# Patient Record
Sex: MALE | Race: WHITE | HISPANIC OR LATINO | Employment: UNEMPLOYED | URBAN - METROPOLITAN AREA
[De-identification: names, ages, dates, MRNs, and addresses within clinical notes are randomized per-mention and may not be internally consistent; named-entity substitution may affect disease eponyms.]

---

## 2023-02-02 ENCOUNTER — HOSPITAL ENCOUNTER (OUTPATIENT)
Dept: RADIOLOGY | Facility: HOSPITAL | Age: 83
Discharge: HOME | DRG: 522 | End: 2023-02-02
Attending: STUDENT IN AN ORGANIZED HEALTH CARE EDUCATION/TRAINING PROGRAM
Payer: MEDICAID

## 2023-02-02 ENCOUNTER — ANESTHESIA EVENT (OUTPATIENT)
Dept: SURGERY | Facility: HOSPITAL | Age: 83
DRG: 522 | End: 2023-02-02
Payer: MEDICAID

## 2023-02-02 ENCOUNTER — HOSPITAL ENCOUNTER (INPATIENT)
Facility: HOSPITAL | Age: 83
LOS: 4 days | Discharge: HOME | DRG: 522 | End: 2023-02-06
Attending: STUDENT IN AN ORGANIZED HEALTH CARE EDUCATION/TRAINING PROGRAM | Admitting: STUDENT IN AN ORGANIZED HEALTH CARE EDUCATION/TRAINING PROGRAM
Payer: MEDICAID

## 2023-02-02 DIAGNOSIS — S79.911A INJURY OF RIGHT HIP, INITIAL ENCOUNTER: Primary | ICD-10-CM

## 2023-02-02 DIAGNOSIS — S72.001A CLOSED DISPLACED FRACTURE OF RIGHT FEMORAL NECK: ICD-10-CM

## 2023-02-02 DIAGNOSIS — Z01.811 PRE-OP CHEST EXAM: ICD-10-CM

## 2023-02-02 DIAGNOSIS — S72.001A CLOSED FRACTURE OF NECK OF RIGHT FEMUR, INITIAL ENCOUNTER: Primary | ICD-10-CM

## 2023-02-02 DIAGNOSIS — S79.911A INJURY OF RIGHT HIP, INITIAL ENCOUNTER: ICD-10-CM

## 2023-02-02 PROBLEM — J96.11 CHRONIC HYPOXEMIC RESPIRATORY FAILURE: Status: ACTIVE | Noted: 2023-02-02

## 2023-02-02 PROBLEM — M80.00XA AGE-RELATED OSTEOPOROSIS WITH CURRENT PATHOLOGICAL FRACTURE: Status: ACTIVE | Noted: 2023-02-02

## 2023-02-02 LAB
25(OH)D3+25(OH)D2 SERPL-MCNC: 22 NG/ML (ref 30–96)
ABO + RH BLD: NORMAL
ALBUMIN SERPL BCP-MCNC: 3.3 G/DL (ref 3.5–5.2)
ALP SERPL-CCNC: 73 U/L (ref 55–135)
ALT SERPL W/O P-5'-P-CCNC: 18 U/L (ref 10–44)
ANION GAP SERPL CALC-SCNC: 5 MMOL/L (ref 8–16)
APTT BLDCRRT: 26.7 SEC (ref 21–32)
AST SERPL-CCNC: 18 U/L (ref 10–40)
BACTERIA #/AREA URNS AUTO: ABNORMAL /HPF
BASOPHILS # BLD AUTO: 0.03 K/UL (ref 0–0.2)
BASOPHILS NFR BLD: 0.3 % (ref 0–1.9)
BILIRUB SERPL-MCNC: 1.3 MG/DL (ref 0.1–1)
BILIRUB UR QL STRIP: NEGATIVE
BLD GP AB SCN CELLS X3 SERPL QL: NORMAL
BNP SERPL-MCNC: 55 PG/ML (ref 0–99)
BUN SERPL-MCNC: 24 MG/DL (ref 8–23)
CALCIUM SERPL-MCNC: 8.8 MG/DL (ref 8.7–10.5)
CHLORIDE SERPL-SCNC: 105 MMOL/L (ref 95–110)
CLARITY UR REFRACT.AUTO: ABNORMAL
CO2 SERPL-SCNC: 28 MMOL/L (ref 23–29)
COLOR UR AUTO: ABNORMAL
CREAT SERPL-MCNC: 0.9 MG/DL (ref 0.5–1.4)
DIFFERENTIAL METHOD: ABNORMAL
EOSINOPHIL # BLD AUTO: 0.3 K/UL (ref 0–0.5)
EOSINOPHIL NFR BLD: 2.7 % (ref 0–8)
ERYTHROCYTE [DISTWIDTH] IN BLOOD BY AUTOMATED COUNT: 13.9 % (ref 11.5–14.5)
EST. GFR  (NO RACE VARIABLE): >60 ML/MIN/1.73 M^2
ESTIMATED AVG GLUCOSE: 123 MG/DL (ref 68–131)
GLUCOSE SERPL-MCNC: 118 MG/DL (ref 70–110)
GLUCOSE UR QL STRIP: NEGATIVE
HBA1C MFR BLD: 5.9 % (ref 4–5.6)
HCT VFR BLD AUTO: 46.3 % (ref 40–54)
HGB BLD-MCNC: 14.7 G/DL (ref 14–18)
HGB UR QL STRIP: ABNORMAL
HYALINE CASTS UR QL AUTO: 0 /LPF
IMM GRANULOCYTES # BLD AUTO: 0.04 K/UL (ref 0–0.04)
IMM GRANULOCYTES NFR BLD AUTO: 0.3 % (ref 0–0.5)
INR PPP: 1 (ref 0.8–1.2)
KETONES UR QL STRIP: ABNORMAL
LEUKOCYTE ESTERASE UR QL STRIP: ABNORMAL
LYMPHOCYTES # BLD AUTO: 0.6 K/UL (ref 1–4.8)
LYMPHOCYTES NFR BLD: 5.2 % (ref 18–48)
MCH RBC QN AUTO: 28.8 PG (ref 27–31)
MCHC RBC AUTO-ENTMCNC: 31.7 G/DL (ref 32–36)
MCV RBC AUTO: 91 FL (ref 82–98)
MICROSCOPIC COMMENT: ABNORMAL
MONOCYTES # BLD AUTO: 1.2 K/UL (ref 0.3–1)
MONOCYTES NFR BLD: 10.7 % (ref 4–15)
NEUTROPHILS # BLD AUTO: 9.3 K/UL (ref 1.8–7.7)
NEUTROPHILS NFR BLD: 80.8 % (ref 38–73)
NITRITE UR QL STRIP: NEGATIVE
NRBC BLD-RTO: 0 /100 WBC
PH UR STRIP: 6 [PH] (ref 5–8)
PLATELET # BLD AUTO: 198 K/UL (ref 150–450)
PMV BLD AUTO: 10.7 FL (ref 9.2–12.9)
POTASSIUM SERPL-SCNC: 4.3 MMOL/L (ref 3.5–5.1)
PROT SERPL-MCNC: 6.5 G/DL (ref 6–8.4)
PROT UR QL STRIP: ABNORMAL
PROTHROMBIN TIME: 10.8 SEC (ref 9–12.5)
RBC # BLD AUTO: 5.1 M/UL (ref 4.6–6.2)
RBC #/AREA URNS AUTO: >100 /HPF (ref 0–4)
SODIUM SERPL-SCNC: 138 MMOL/L (ref 136–145)
SP GR UR STRIP: >1.03 (ref 1–1.03)
URN SPEC COLLECT METH UR: ABNORMAL
WBC # BLD AUTO: 11.54 K/UL (ref 3.9–12.7)
WBC #/AREA URNS AUTO: 2 /HPF (ref 0–5)

## 2023-02-02 PROCEDURE — 99285 PR EMERGENCY DEPT VISIT,LEVEL V: ICD-10-PCS | Mod: ,,, | Performed by: STUDENT IN AN ORGANIZED HEALTH CARE EDUCATION/TRAINING PROGRAM

## 2023-02-02 PROCEDURE — 63600175 PHARM REV CODE 636 W HCPCS: Performed by: STUDENT IN AN ORGANIZED HEALTH CARE EDUCATION/TRAINING PROGRAM

## 2023-02-02 PROCEDURE — 81001 URINALYSIS AUTO W/SCOPE: CPT | Performed by: STUDENT IN AN ORGANIZED HEALTH CARE EDUCATION/TRAINING PROGRAM

## 2023-02-02 PROCEDURE — 73502 XR HIP WITH PELVIS WHEN PERFORMED, 2 OR 3  VIEWS RIGHT: ICD-10-PCS | Mod: 26,RT,, | Performed by: RADIOLOGY

## 2023-02-02 PROCEDURE — 73502 X-RAY EXAM HIP UNI 2-3 VIEWS: CPT | Mod: TC,RT

## 2023-02-02 PROCEDURE — 85730 THROMBOPLASTIN TIME PARTIAL: CPT

## 2023-02-02 PROCEDURE — 82306 VITAMIN D 25 HYDROXY: CPT | Performed by: STUDENT IN AN ORGANIZED HEALTH CARE EDUCATION/TRAINING PROGRAM

## 2023-02-02 PROCEDURE — 86900 BLOOD TYPING SEROLOGIC ABO: CPT

## 2023-02-02 PROCEDURE — 99223 1ST HOSP IP/OBS HIGH 75: CPT | Mod: ,,, | Performed by: HOSPITALIST

## 2023-02-02 PROCEDURE — 83036 HEMOGLOBIN GLYCOSYLATED A1C: CPT | Performed by: STUDENT IN AN ORGANIZED HEALTH CARE EDUCATION/TRAINING PROGRAM

## 2023-02-02 PROCEDURE — 25000003 PHARM REV CODE 250: Performed by: HOSPITALIST

## 2023-02-02 PROCEDURE — 93010 ELECTROCARDIOGRAM REPORT: CPT | Mod: ,,, | Performed by: INTERNAL MEDICINE

## 2023-02-02 PROCEDURE — 99285 EMERGENCY DEPT VISIT HI MDM: CPT | Mod: 25

## 2023-02-02 PROCEDURE — 86920 COMPATIBILITY TEST SPIN: CPT | Performed by: STUDENT IN AN ORGANIZED HEALTH CARE EDUCATION/TRAINING PROGRAM

## 2023-02-02 PROCEDURE — 93005 ELECTROCARDIOGRAM TRACING: CPT

## 2023-02-02 PROCEDURE — 99223 PR INITIAL HOSPITAL CARE,LEVL III: ICD-10-PCS | Mod: ,,, | Performed by: HOSPITALIST

## 2023-02-02 PROCEDURE — 85610 PROTHROMBIN TIME: CPT

## 2023-02-02 PROCEDURE — 85025 COMPLETE CBC W/AUTO DIFF WBC: CPT

## 2023-02-02 PROCEDURE — 73502 X-RAY EXAM HIP UNI 2-3 VIEWS: CPT | Mod: 26,RT,, | Performed by: RADIOLOGY

## 2023-02-02 PROCEDURE — 99285 EMERGENCY DEPT VISIT HI MDM: CPT | Mod: ,,, | Performed by: STUDENT IN AN ORGANIZED HEALTH CARE EDUCATION/TRAINING PROGRAM

## 2023-02-02 PROCEDURE — 93010 EKG 12-LEAD: ICD-10-PCS | Mod: ,,, | Performed by: INTERNAL MEDICINE

## 2023-02-02 PROCEDURE — 12000002 HC ACUTE/MED SURGE SEMI-PRIVATE ROOM

## 2023-02-02 PROCEDURE — 80053 COMPREHEN METABOLIC PANEL: CPT

## 2023-02-02 PROCEDURE — 96374 THER/PROPH/DIAG INJ IV PUSH: CPT

## 2023-02-02 PROCEDURE — 83880 ASSAY OF NATRIURETIC PEPTIDE: CPT

## 2023-02-02 RX ORDER — METHOCARBAMOL 500 MG/1
500 TABLET, FILM COATED ORAL EVERY 6 HOURS PRN
Status: DISCONTINUED | OUTPATIENT
Start: 2023-02-02 | End: 2023-02-06 | Stop reason: HOSPADM

## 2023-02-02 RX ORDER — BUDESONIDE 90 UG/1
2 AEROSOL, POWDER RESPIRATORY (INHALATION)
COMMUNITY

## 2023-02-02 RX ORDER — MORPHINE SULFATE 4 MG/ML
2 INJECTION, SOLUTION INTRAMUSCULAR; INTRAVENOUS
Status: DISCONTINUED | OUTPATIENT
Start: 2023-02-02 | End: 2023-02-03 | Stop reason: HOSPADM

## 2023-02-02 RX ORDER — OXYCODONE HYDROCHLORIDE 5 MG/1
5 TABLET ORAL
Status: DISCONTINUED | OUTPATIENT
Start: 2023-02-02 | End: 2023-02-03 | Stop reason: HOSPADM

## 2023-02-02 RX ORDER — LIDOCAINE HYDROCHLORIDE 10 MG/ML
1 INJECTION, SOLUTION EPIDURAL; INFILTRATION; INTRACAUDAL; PERINEURAL
Status: CANCELLED | OUTPATIENT
Start: 2023-02-02

## 2023-02-02 RX ORDER — MORPHINE SULFATE 4 MG/ML
4 INJECTION, SOLUTION INTRAMUSCULAR; INTRAVENOUS
Status: DISCONTINUED | OUTPATIENT
Start: 2023-02-02 | End: 2023-02-02

## 2023-02-02 RX ORDER — SODIUM CHLORIDE 0.9 % (FLUSH) 0.9 %
10 SYRINGE (ML) INJECTION
Status: DISCONTINUED | OUTPATIENT
Start: 2023-02-02 | End: 2023-02-06 | Stop reason: HOSPADM

## 2023-02-02 RX ORDER — FLUTICASONE FUROATE AND VILANTEROL 100; 25 UG/1; UG/1
1 POWDER RESPIRATORY (INHALATION) DAILY
Status: DISCONTINUED | OUTPATIENT
Start: 2023-02-03 | End: 2023-02-06 | Stop reason: HOSPADM

## 2023-02-02 RX ORDER — FENTANYL CITRATE 50 UG/ML
25 INJECTION, SOLUTION INTRAMUSCULAR; INTRAVENOUS EVERY 5 MIN PRN
Status: CANCELLED | OUTPATIENT
Start: 2023-02-02

## 2023-02-02 RX ORDER — OXYCODONE AND ACETAMINOPHEN 5; 325 MG/1; MG/1
1 TABLET ORAL
Status: DISCONTINUED | OUTPATIENT
Start: 2023-02-02 | End: 2023-02-02

## 2023-02-02 RX ORDER — MUPIROCIN 20 MG/G
1 OINTMENT TOPICAL
Status: CANCELLED | OUTPATIENT
Start: 2023-02-02

## 2023-02-02 RX ORDER — OXYCODONE HYDROCHLORIDE 5 MG/1
10 TABLET ORAL
Status: DISCONTINUED | OUTPATIENT
Start: 2023-02-02 | End: 2023-02-03 | Stop reason: HOSPADM

## 2023-02-02 RX ORDER — BISACODYL 10 MG
10 SUPPOSITORY, RECTAL RECTAL DAILY PRN
Status: DISCONTINUED | OUTPATIENT
Start: 2023-02-02 | End: 2023-02-06 | Stop reason: HOSPADM

## 2023-02-02 RX ORDER — TALC
6 POWDER (GRAM) TOPICAL NIGHTLY PRN
Status: DISCONTINUED | OUTPATIENT
Start: 2023-02-02 | End: 2023-02-06 | Stop reason: HOSPADM

## 2023-02-02 RX ORDER — MUPIROCIN 20 MG/G
1 OINTMENT TOPICAL 2 TIMES DAILY
Status: CANCELLED | OUTPATIENT
Start: 2023-02-02 | End: 2023-02-07

## 2023-02-02 RX ORDER — SODIUM CHLORIDE 9 MG/ML
INJECTION, SOLUTION INTRAVENOUS CONTINUOUS
Status: ACTIVE | OUTPATIENT
Start: 2023-02-02 | End: 2023-02-03

## 2023-02-02 RX ORDER — ONDANSETRON 2 MG/ML
4 INJECTION INTRAMUSCULAR; INTRAVENOUS EVERY 12 HOURS PRN
Status: DISCONTINUED | OUTPATIENT
Start: 2023-02-02 | End: 2023-02-06 | Stop reason: HOSPADM

## 2023-02-02 RX ORDER — ACETAMINOPHEN 500 MG
1000 TABLET ORAL EVERY 8 HOURS
Status: COMPLETED | OUTPATIENT
Start: 2023-02-02 | End: 2023-02-03

## 2023-02-02 RX ORDER — PREGABALIN 75 MG/1
75 CAPSULE ORAL NIGHTLY
Status: DISCONTINUED | OUTPATIENT
Start: 2023-02-02 | End: 2023-02-03 | Stop reason: HOSPADM

## 2023-02-02 RX ORDER — MORPHINE SULFATE 4 MG/ML
4 INJECTION, SOLUTION INTRAMUSCULAR; INTRAVENOUS
Status: COMPLETED | OUTPATIENT
Start: 2023-02-02 | End: 2023-02-02

## 2023-02-02 RX ADMIN — MORPHINE SULFATE 4 MG: 4 INJECTION INTRAVENOUS at 06:02

## 2023-02-02 RX ADMIN — OXYCODONE HYDROCHLORIDE 5 MG: 5 TABLET ORAL at 09:02

## 2023-02-02 RX ADMIN — PREGABALIN 75 MG: 75 CAPSULE ORAL at 09:02

## 2023-02-02 RX ADMIN — SODIUM CHLORIDE: 9 INJECTION, SOLUTION INTRAVENOUS at 11:02

## 2023-02-02 RX ADMIN — ACETAMINOPHEN 1000 MG: 500 TABLET ORAL at 09:02

## 2023-02-02 NOTE — ED PROVIDER NOTES
Encounter Date: 2/2/2023       History     Chief Complaint   Patient presents with    Fall     Fall yesterday, denies LOC, x-ray today right hip fracture - hx of COPD on O2 at times      82 M with pmhx of COPD on 2L home oxygen who presents for evaluation of R hip fracture. He fell last night after tripping over a dog gate. He landed on his R glute & reported some pain in the area last night but was able to walk to bed. This morning his pain was significantly worse & he was unable to walk without significant assistance. He was brought to outpatient imaging for evaluation & pelvic Xray was positive for impacted R femoral neck fracture. He is accompanied by his son who is a physician & acted as  per patient preference. He denies any symptoms preceding this fall. He denies any other symptoms beyond R hip pain.     Review of patient's allergies indicates:  No Known Allergies  No past medical history on file.  No past surgical history on file.  No family history on file.     Review of Systems   Constitutional:  Negative for chills and fever.   HENT:  Negative for sore throat.    Eyes:  Negative for visual disturbance.   Respiratory:  Negative for cough and shortness of breath.    Cardiovascular:  Negative for chest pain, palpitations and leg swelling.   Gastrointestinal:  Negative for abdominal pain, diarrhea, nausea and vomiting.   Genitourinary:  Negative for flank pain and hematuria.   Musculoskeletal:  Positive for arthralgias.   Neurological:  Negative for dizziness and headaches.   Psychiatric/Behavioral:  Negative for confusion.      Physical Exam     Initial Vitals   BP Pulse Resp Temp SpO2   02/02/23 1638 02/02/23 1638 02/02/23 1638 02/02/23 1641 02/02/23 1638   (!) 93/51 84 20 98.4 °F (36.9 °C) (!) 93 %      MAP       --                Physical Exam    Constitutional: He appears well-developed and well-nourished. He is not diaphoretic.   HENT:   Head: Normocephalic.   Eyes: Conjunctivae are normal.  No scleral icterus.   Cardiovascular:  Normal rate, regular rhythm, normal heart sounds and intact distal pulses.     Exam reveals no gallop and no friction rub.       No murmur heard.  Pulmonary/Chest: Breath sounds normal. He has no wheezes. He has no rhonchi. He has no rales.   Abdominal: Abdomen is soft. Bowel sounds are normal. He exhibits no distension. There is no abdominal tenderness. There is no rebound and no guarding.   Musculoskeletal:         General: Tenderness present.      Comments: TTP at R hip. Report pain with movement of R leg.      Neurological: He is alert and oriented to person, place, and time.   Skin: Skin is warm and dry. Capillary refill takes less than 2 seconds.       ED Course   Procedures  Labs Reviewed   COMPREHENSIVE METABOLIC PANEL - Abnormal; Notable for the following components:       Result Value    Glucose 118 (*)     BUN 24 (*)     Albumin 3.3 (*)     Total Bilirubin 1.3 (*)     Anion Gap 5 (*)     All other components within normal limits   CBC W/ AUTO DIFFERENTIAL - Abnormal; Notable for the following components:    MCHC 31.7 (*)     Gran # (ANC) 9.3 (*)     Lymph # 0.6 (*)     Mono # 1.2 (*)     Gran % 80.8 (*)     Lymph % 5.2 (*)     All other components within normal limits   VITAMIN D - Abnormal; Notable for the following components:    Vit D, 25-Hydroxy 22 (*)     All other components within normal limits   HEMOGLOBIN A1C - Abnormal; Notable for the following components:    Hemoglobin A1C 5.9 (*)     All other components within normal limits   PROTIME-INR   APTT   TYPE & SCREEN     EKG Readings: (Independently Interpreted)   Initial Reading: No STEMI. Rhythm: Normal Sinus Rhythm. Heart Rate: 66. Ectopy: No Ectopy. ST Segments: Normal ST Segments. T Waves: Normal. Axis: Left Axis Deviation.     Imaging Results              X-Ray Chest AP Portable (In process)                      Medications   morphine injection 4 mg (has no administration in time range)   morphine  injection 4 mg (4 mg Intravenous Given 2/2/23 5345)     Medical Decision Making:   Initial Assessment:   81 yo with traumatic R hip fracture  Differential Diagnosis:   R hip fracture  Clinical Tests:   Lab Tests: Ordered  Radiological Study: Ordered  Medical Tests: Ordered  ED Management:  Pt arrives with XR diagnostic of hip fracture. Pre op workup ordered including coagulation studies, EKG, CXR, CBC, and CMP. Orthopedic surgery consulted for surgical workup.    Will admit to orthopedic surgery hip service for correction tomorrow.                         Clinical Impression:   Final diagnoses:  [Z01.811] Pre-op chest exam  [S72.001A] Closed fracture of neck of right femur, initial encounter (Primary)        ED Disposition Condition    Admit Stable                Tarun Juarez MD  Resident  02/02/23 5131

## 2023-02-03 ENCOUNTER — ANESTHESIA (OUTPATIENT)
Dept: SURGERY | Facility: HOSPITAL | Age: 83
DRG: 522 | End: 2023-02-03
Payer: MEDICAID

## 2023-02-03 LAB
ALBUMIN SERPL BCP-MCNC: 2.9 G/DL (ref 3.5–5.2)
ALP SERPL-CCNC: 65 U/L (ref 55–135)
ALT SERPL W/O P-5'-P-CCNC: 14 U/L (ref 10–44)
ANION GAP SERPL CALC-SCNC: 8 MMOL/L (ref 8–16)
AST SERPL-CCNC: 15 U/L (ref 10–40)
BASOPHILS # BLD AUTO: 0.03 K/UL (ref 0–0.2)
BASOPHILS # BLD AUTO: 0.03 K/UL (ref 0–0.2)
BASOPHILS NFR BLD: 0.3 % (ref 0–1.9)
BASOPHILS NFR BLD: 0.3 % (ref 0–1.9)
BILIRUB SERPL-MCNC: 1.2 MG/DL (ref 0.1–1)
BUN SERPL-MCNC: 18 MG/DL (ref 8–23)
CALCIUM SERPL-MCNC: 8.2 MG/DL (ref 8.7–10.5)
CHLORIDE SERPL-SCNC: 107 MMOL/L (ref 95–110)
CO2 SERPL-SCNC: 22 MMOL/L (ref 23–29)
CREAT SERPL-MCNC: 0.7 MG/DL (ref 0.5–1.4)
DIFFERENTIAL METHOD: ABNORMAL
DIFFERENTIAL METHOD: ABNORMAL
EOSINOPHIL # BLD AUTO: 0.2 K/UL (ref 0–0.5)
EOSINOPHIL # BLD AUTO: 0.5 K/UL (ref 0–0.5)
EOSINOPHIL NFR BLD: 1.4 % (ref 0–8)
EOSINOPHIL NFR BLD: 5.6 % (ref 0–8)
ERYTHROCYTE [DISTWIDTH] IN BLOOD BY AUTOMATED COUNT: 13.8 % (ref 11.5–14.5)
ERYTHROCYTE [DISTWIDTH] IN BLOOD BY AUTOMATED COUNT: 14 % (ref 11.5–14.5)
EST. GFR  (NO RACE VARIABLE): >60 ML/MIN/1.73 M^2
GLUCOSE SERPL-MCNC: 115 MG/DL (ref 70–110)
HCT VFR BLD AUTO: 33.6 % (ref 40–54)
HCT VFR BLD AUTO: 43 % (ref 40–54)
HGB BLD-MCNC: 10.4 G/DL (ref 14–18)
HGB BLD-MCNC: 13.6 G/DL (ref 14–18)
IMM GRANULOCYTES # BLD AUTO: 0.03 K/UL (ref 0–0.04)
IMM GRANULOCYTES # BLD AUTO: 0.06 K/UL (ref 0–0.04)
IMM GRANULOCYTES NFR BLD AUTO: 0.3 % (ref 0–0.5)
IMM GRANULOCYTES NFR BLD AUTO: 0.5 % (ref 0–0.5)
LYMPHOCYTES # BLD AUTO: 0.4 K/UL (ref 1–4.8)
LYMPHOCYTES # BLD AUTO: 0.9 K/UL (ref 1–4.8)
LYMPHOCYTES NFR BLD: 10 % (ref 18–48)
LYMPHOCYTES NFR BLD: 3.3 % (ref 18–48)
MAGNESIUM SERPL-MCNC: 2 MG/DL (ref 1.6–2.6)
MCH RBC QN AUTO: 28.9 PG (ref 27–31)
MCH RBC QN AUTO: 29.4 PG (ref 27–31)
MCHC RBC AUTO-ENTMCNC: 31 G/DL (ref 32–36)
MCHC RBC AUTO-ENTMCNC: 31.6 G/DL (ref 32–36)
MCV RBC AUTO: 93 FL (ref 82–98)
MCV RBC AUTO: 93 FL (ref 82–98)
MONOCYTES # BLD AUTO: 1 K/UL (ref 0.3–1)
MONOCYTES # BLD AUTO: 1 K/UL (ref 0.3–1)
MONOCYTES NFR BLD: 10.9 % (ref 4–15)
MONOCYTES NFR BLD: 8.2 % (ref 4–15)
NEUTROPHILS # BLD AUTO: 10.4 K/UL (ref 1.8–7.7)
NEUTROPHILS # BLD AUTO: 6.5 K/UL (ref 1.8–7.7)
NEUTROPHILS NFR BLD: 72.9 % (ref 38–73)
NEUTROPHILS NFR BLD: 86.3 % (ref 38–73)
NRBC BLD-RTO: 0 /100 WBC
NRBC BLD-RTO: 0 /100 WBC
PHOSPHATE SERPL-MCNC: 3.3 MG/DL (ref 2.7–4.5)
PLATELET # BLD AUTO: 139 K/UL (ref 150–450)
PLATELET # BLD AUTO: 175 K/UL (ref 150–450)
PMV BLD AUTO: 10.8 FL (ref 9.2–12.9)
PMV BLD AUTO: 10.9 FL (ref 9.2–12.9)
POTASSIUM SERPL-SCNC: 4.1 MMOL/L (ref 3.5–5.1)
PROT SERPL-MCNC: 5.6 G/DL (ref 6–8.4)
RBC # BLD AUTO: 3.6 M/UL (ref 4.6–6.2)
RBC # BLD AUTO: 4.63 M/UL (ref 4.6–6.2)
SODIUM SERPL-SCNC: 137 MMOL/L (ref 136–145)
WBC # BLD AUTO: 11.98 K/UL (ref 3.9–12.7)
WBC # BLD AUTO: 8.98 K/UL (ref 3.9–12.7)

## 2023-02-03 PROCEDURE — 71000015 HC POSTOP RECOV 1ST HR: Performed by: ORTHOPAEDIC SURGERY

## 2023-02-03 PROCEDURE — 27130 PR TOTAL HIP ARTHROPLASTY: ICD-10-PCS | Mod: RT,,, | Performed by: ORTHOPAEDIC SURGERY

## 2023-02-03 PROCEDURE — D9220A PRA ANESTHESIA: Mod: CRNA,,, | Performed by: NURSE ANESTHETIST, CERTIFIED REGISTERED

## 2023-02-03 PROCEDURE — 85025 COMPLETE CBC W/AUTO DIFF WBC: CPT | Performed by: HOSPITALIST

## 2023-02-03 PROCEDURE — 25000003 PHARM REV CODE 250: Performed by: STUDENT IN AN ORGANIZED HEALTH CARE EDUCATION/TRAINING PROGRAM

## 2023-02-03 PROCEDURE — 84100 ASSAY OF PHOSPHORUS: CPT | Performed by: HOSPITALIST

## 2023-02-03 PROCEDURE — D9220A PRA ANESTHESIA: ICD-10-PCS | Mod: CRNA,,, | Performed by: NURSE ANESTHETIST, CERTIFIED REGISTERED

## 2023-02-03 PROCEDURE — 37000008 HC ANESTHESIA 1ST 15 MINUTES: Performed by: ORTHOPAEDIC SURGERY

## 2023-02-03 PROCEDURE — 63600175 PHARM REV CODE 636 W HCPCS

## 2023-02-03 PROCEDURE — 36415 COLL VENOUS BLD VENIPUNCTURE: CPT | Performed by: HOSPITALIST

## 2023-02-03 PROCEDURE — 36000711: Performed by: ORTHOPAEDIC SURGERY

## 2023-02-03 PROCEDURE — 36000710: Performed by: ORTHOPAEDIC SURGERY

## 2023-02-03 PROCEDURE — D9220A PRA ANESTHESIA: ICD-10-PCS | Mod: ANES,,, | Performed by: ANESTHESIOLOGY

## 2023-02-03 PROCEDURE — 25000003 PHARM REV CODE 250: Performed by: ORTHOPAEDIC SURGERY

## 2023-02-03 PROCEDURE — 25000003 PHARM REV CODE 250

## 2023-02-03 PROCEDURE — C1713 ANCHOR/SCREW BN/BN,TIS/BN: HCPCS | Performed by: ORTHOPAEDIC SURGERY

## 2023-02-03 PROCEDURE — 25000003 PHARM REV CODE 250: Performed by: HOSPITALIST

## 2023-02-03 PROCEDURE — 94761 N-INVAS EAR/PLS OXIMETRY MLT: CPT

## 2023-02-03 PROCEDURE — 83735 ASSAY OF MAGNESIUM: CPT | Performed by: HOSPITALIST

## 2023-02-03 PROCEDURE — C1776 JOINT DEVICE (IMPLANTABLE): HCPCS | Performed by: ORTHOPAEDIC SURGERY

## 2023-02-03 PROCEDURE — 99233 PR SUBSEQUENT HOSPITAL CARE,LEVL III: ICD-10-PCS | Mod: ,,, | Performed by: HOSPITALIST

## 2023-02-03 PROCEDURE — 37000009 HC ANESTHESIA EA ADD 15 MINS: Performed by: ORTHOPAEDIC SURGERY

## 2023-02-03 PROCEDURE — 71000033 HC RECOVERY, INTIAL HOUR: Performed by: ORTHOPAEDIC SURGERY

## 2023-02-03 PROCEDURE — 63600175 PHARM REV CODE 636 W HCPCS: Performed by: ORTHOPAEDIC SURGERY

## 2023-02-03 PROCEDURE — 99223 1ST HOSP IP/OBS HIGH 75: CPT | Mod: 57,,, | Performed by: ORTHOPAEDIC SURGERY

## 2023-02-03 PROCEDURE — 63600175 PHARM REV CODE 636 W HCPCS: Performed by: HOSPITALIST

## 2023-02-03 PROCEDURE — 63600175 PHARM REV CODE 636 W HCPCS: Performed by: NURSE ANESTHETIST, CERTIFIED REGISTERED

## 2023-02-03 PROCEDURE — 85025 COMPLETE CBC W/AUTO DIFF WBC: CPT | Mod: 91 | Performed by: STUDENT IN AN ORGANIZED HEALTH CARE EDUCATION/TRAINING PROGRAM

## 2023-02-03 PROCEDURE — 27130 TOTAL HIP ARTHROPLASTY: CPT | Mod: RT,,, | Performed by: ORTHOPAEDIC SURGERY

## 2023-02-03 PROCEDURE — 99233 SBSQ HOSP IP/OBS HIGH 50: CPT | Mod: ,,, | Performed by: HOSPITALIST

## 2023-02-03 PROCEDURE — 27800903 OPTIME MED/SURG SUP & DEVICES OTHER IMPLANTS: Performed by: ORTHOPAEDIC SURGERY

## 2023-02-03 PROCEDURE — 27201423 OPTIME MED/SURG SUP & DEVICES STERILE SUPPLY: Performed by: ORTHOPAEDIC SURGERY

## 2023-02-03 PROCEDURE — 99223 PR INITIAL HOSPITAL CARE,LEVL III: ICD-10-PCS | Mod: 57,,, | Performed by: ORTHOPAEDIC SURGERY

## 2023-02-03 PROCEDURE — 63600175 PHARM REV CODE 636 W HCPCS: Performed by: STUDENT IN AN ORGANIZED HEALTH CARE EDUCATION/TRAINING PROGRAM

## 2023-02-03 PROCEDURE — 11000001 HC ACUTE MED/SURG PRIVATE ROOM

## 2023-02-03 PROCEDURE — D9220A PRA ANESTHESIA: Mod: ANES,,, | Performed by: ANESTHESIOLOGY

## 2023-02-03 PROCEDURE — 80053 COMPREHEN METABOLIC PANEL: CPT | Performed by: HOSPITALIST

## 2023-02-03 PROCEDURE — 25000003 PHARM REV CODE 250: Performed by: NURSE ANESTHETIST, CERTIFIED REGISTERED

## 2023-02-03 PROCEDURE — C1769 GUIDE WIRE: HCPCS | Performed by: ORTHOPAEDIC SURGERY

## 2023-02-03 DEVICE — SHELL TRIDENT II ACET F 56MM: Type: IMPLANTABLE DEVICE | Site: HIP | Status: FUNCTIONAL

## 2023-02-03 DEVICE — CEMENT BONE SURG SMPLX P RADPQ: Type: IMPLANTABLE DEVICE | Site: HIP | Status: FUNCTIONAL

## 2023-02-03 DEVICE — STEM OMNIFIT EON 132DEG SZ6: Type: IMPLANTABLE DEVICE | Site: HIP | Status: FUNCTIONAL

## 2023-02-03 DEVICE — SCREW TRIDENT II LP HEX 6.5X25: Type: IMPLANTABLE DEVICE | Site: HIP | Status: FUNCTIONAL

## 2023-02-03 DEVICE — SCREW TRIDENT II LP HEX 6.5X20: Type: IMPLANTABLE DEVICE | Site: HIP | Status: FUNCTIONAL

## 2023-02-03 DEVICE — INSERT TRIDENT X3 0 DEG 36MM F: Type: IMPLANTABLE DEVICE | Site: HIP | Status: FUNCTIONAL

## 2023-02-03 DEVICE — IMPLANTABLE DEVICE: Type: IMPLANTABLE DEVICE | Site: HIP | Status: FUNCTIONAL

## 2023-02-03 DEVICE — SPACER CEMENT DISTAL 10MM: Type: IMPLANTABLE DEVICE | Site: HIP | Status: FUNCTIONAL

## 2023-02-03 RX ORDER — ROCURONIUM BROMIDE 10 MG/ML
INJECTION, SOLUTION INTRAVENOUS
Status: DISCONTINUED | OUTPATIENT
Start: 2023-02-03 | End: 2023-02-03

## 2023-02-03 RX ORDER — AMOXICILLIN 250 MG
1 CAPSULE ORAL 2 TIMES DAILY
Status: DISCONTINUED | OUTPATIENT
Start: 2023-02-03 | End: 2023-02-06 | Stop reason: HOSPADM

## 2023-02-03 RX ORDER — MUPIROCIN 20 MG/G
OINTMENT TOPICAL
Status: DISCONTINUED | OUTPATIENT
Start: 2023-02-03 | End: 2023-02-03 | Stop reason: HOSPADM

## 2023-02-03 RX ORDER — ROPIVACAINE/EPI/CLONIDINE/KET 2.46-0.005
SYRINGE (ML) INJECTION
Status: DISCONTINUED | OUTPATIENT
Start: 2023-02-03 | End: 2023-02-03 | Stop reason: HOSPADM

## 2023-02-03 RX ORDER — CEFAZOLIN SODIUM 1 G/3ML
INJECTION, POWDER, FOR SOLUTION INTRAMUSCULAR; INTRAVENOUS
Status: DISCONTINUED | OUTPATIENT
Start: 2023-02-03 | End: 2023-02-03

## 2023-02-03 RX ORDER — PHENYLEPHRINE HYDROCHLORIDE 10 MG/ML
INJECTION INTRAVENOUS
Status: DISCONTINUED | OUTPATIENT
Start: 2023-02-03 | End: 2023-02-03

## 2023-02-03 RX ORDER — DEXAMETHASONE SODIUM PHOSPHATE 4 MG/ML
INJECTION, SOLUTION INTRA-ARTICULAR; INTRALESIONAL; INTRAMUSCULAR; INTRAVENOUS; SOFT TISSUE
Status: DISCONTINUED | OUTPATIENT
Start: 2023-02-03 | End: 2023-02-03

## 2023-02-03 RX ORDER — CELECOXIB 100 MG/1
100 CAPSULE ORAL DAILY
Status: DISCONTINUED | OUTPATIENT
Start: 2023-02-03 | End: 2023-02-06 | Stop reason: HOSPADM

## 2023-02-03 RX ORDER — ONDANSETRON 2 MG/ML
4 INJECTION INTRAMUSCULAR; INTRAVENOUS DAILY PRN
Status: DISCONTINUED | OUTPATIENT
Start: 2023-02-03 | End: 2023-02-03 | Stop reason: HOSPADM

## 2023-02-03 RX ORDER — HYDROMORPHONE HYDROCHLORIDE 1 MG/ML
0.2 INJECTION, SOLUTION INTRAMUSCULAR; INTRAVENOUS; SUBCUTANEOUS EVERY 5 MIN PRN
Status: DISCONTINUED | OUTPATIENT
Start: 2023-02-03 | End: 2023-02-03 | Stop reason: HOSPADM

## 2023-02-03 RX ORDER — LIDOCAINE HYDROCHLORIDE 20 MG/ML
INJECTION INTRAVENOUS
Status: DISCONTINUED | OUTPATIENT
Start: 2023-02-03 | End: 2023-02-03

## 2023-02-03 RX ORDER — METHOCARBAMOL 500 MG/1
500 TABLET, FILM COATED ORAL 4 TIMES DAILY
Status: DISCONTINUED | OUTPATIENT
Start: 2023-02-03 | End: 2023-02-06 | Stop reason: HOSPADM

## 2023-02-03 RX ORDER — ONDANSETRON 2 MG/ML
INJECTION INTRAMUSCULAR; INTRAVENOUS
Status: DISCONTINUED | OUTPATIENT
Start: 2023-02-03 | End: 2023-02-03

## 2023-02-03 RX ORDER — EPHEDRINE SULFATE 50 MG/ML
INJECTION, SOLUTION INTRAVENOUS
Status: DISCONTINUED | OUTPATIENT
Start: 2023-02-03 | End: 2023-02-03

## 2023-02-03 RX ORDER — ROPIVACAINE HYDROCHLORIDE 2 MG/ML
0.1 INJECTION, SOLUTION EPIDURAL; INFILTRATION; PERINEURAL CONTINUOUS
Status: DISCONTINUED | OUTPATIENT
Start: 2023-02-03 | End: 2023-02-03

## 2023-02-03 RX ORDER — POLYETHYLENE GLYCOL 3350 17 G/17G
17 POWDER, FOR SOLUTION ORAL DAILY
Status: DISCONTINUED | OUTPATIENT
Start: 2023-02-03 | End: 2023-02-06 | Stop reason: HOSPADM

## 2023-02-03 RX ORDER — SODIUM CHLORIDE 0.9 % (FLUSH) 0.9 %
10 SYRINGE (ML) INJECTION
Status: DISCONTINUED | OUTPATIENT
Start: 2023-02-03 | End: 2023-02-03 | Stop reason: HOSPADM

## 2023-02-03 RX ORDER — VANCOMYCIN HYDROCHLORIDE 1 G/20ML
INJECTION, POWDER, LYOPHILIZED, FOR SOLUTION INTRAVENOUS
Status: DISCONTINUED | OUTPATIENT
Start: 2023-02-03 | End: 2023-02-03 | Stop reason: HOSPADM

## 2023-02-03 RX ORDER — ALBUMIN HUMAN 50 G/1000ML
SOLUTION INTRAVENOUS CONTINUOUS PRN
Status: DISCONTINUED | OUTPATIENT
Start: 2023-02-03 | End: 2023-02-03

## 2023-02-03 RX ORDER — PROPOFOL 10 MG/ML
VIAL (ML) INTRAVENOUS
Status: DISCONTINUED | OUTPATIENT
Start: 2023-02-03 | End: 2023-02-03

## 2023-02-03 RX ORDER — FENTANYL CITRATE 50 UG/ML
INJECTION, SOLUTION INTRAMUSCULAR; INTRAVENOUS
Status: DISCONTINUED | OUTPATIENT
Start: 2023-02-03 | End: 2023-02-03

## 2023-02-03 RX ORDER — ACETAMINOPHEN 500 MG
1000 TABLET ORAL EVERY 6 HOURS
Status: DISPENSED | OUTPATIENT
Start: 2023-02-03 | End: 2023-02-05

## 2023-02-03 RX ORDER — TRANEXAMIC ACID 100 MG/ML
INJECTION, SOLUTION INTRAVENOUS
Status: DISCONTINUED | OUTPATIENT
Start: 2023-02-03 | End: 2023-02-03

## 2023-02-03 RX ADMIN — METHOCARBAMOL 500 MG: 500 TABLET ORAL at 09:02

## 2023-02-03 RX ADMIN — PHENYLEPHRINE HYDROCHLORIDE 150 MCG: 10 INJECTION INTRAVENOUS at 11:02

## 2023-02-03 RX ADMIN — ROCURONIUM BROMIDE 50 MG: 10 INJECTION INTRAVENOUS at 08:02

## 2023-02-03 RX ADMIN — PHENYLEPHRINE HYDROCHLORIDE 100 MCG: 10 INJECTION INTRAVENOUS at 10:02

## 2023-02-03 RX ADMIN — SUGAMMADEX 200 MG: 100 INJECTION, SOLUTION INTRAVENOUS at 01:02

## 2023-02-03 RX ADMIN — FENTANYL CITRATE 100 MCG: 50 INJECTION, SOLUTION INTRAMUSCULAR; INTRAVENOUS at 08:02

## 2023-02-03 RX ADMIN — PHENYLEPHRINE HYDROCHLORIDE 150 MCG: 10 INJECTION INTRAVENOUS at 12:02

## 2023-02-03 RX ADMIN — ROCURONIUM BROMIDE 50 MG: 10 INJECTION INTRAVENOUS at 11:02

## 2023-02-03 RX ADMIN — METHOCARBAMOL 500 MG: 500 TABLET ORAL at 05:02

## 2023-02-03 RX ADMIN — SODIUM CHLORIDE, SODIUM GLUCONATE, SODIUM ACETATE, POTASSIUM CHLORIDE, MAGNESIUM CHLORIDE, SODIUM PHOSPHATE, DIBASIC, AND POTASSIUM PHOSPHATE: .53; .5; .37; .037; .03; .012; .00082 INJECTION, SOLUTION INTRAVENOUS at 10:02

## 2023-02-03 RX ADMIN — CEFAZOLIN 2 G: 330 INJECTION, POWDER, FOR SOLUTION INTRAMUSCULAR; INTRAVENOUS at 08:02

## 2023-02-03 RX ADMIN — PHENYLEPHRINE HYDROCHLORIDE 200 MCG: 10 INJECTION INTRAVENOUS at 09:02

## 2023-02-03 RX ADMIN — DEXAMETHASONE SODIUM PHOSPHATE 4 MG: 4 INJECTION, SOLUTION INTRAMUSCULAR; INTRAVENOUS at 10:02

## 2023-02-03 RX ADMIN — CEFTRIAXONE SODIUM 1 G: 1 INJECTION, POWDER, FOR SOLUTION INTRAMUSCULAR; INTRAVENOUS at 01:02

## 2023-02-03 RX ADMIN — PHENYLEPHRINE HYDROCHLORIDE 100 MCG: 10 INJECTION INTRAVENOUS at 09:02

## 2023-02-03 RX ADMIN — SODIUM CHLORIDE: 9 INJECTION, SOLUTION INTRAVENOUS at 08:02

## 2023-02-03 RX ADMIN — ROCURONIUM BROMIDE 20 MG: 10 INJECTION INTRAVENOUS at 10:02

## 2023-02-03 RX ADMIN — TRANEXAMIC ACID 1000 MG: 100 INJECTION, SOLUTION INTRAVENOUS at 12:02

## 2023-02-03 RX ADMIN — VANCOMYCIN HYDROCHLORIDE 1000 MG: 1 INJECTION, POWDER, LYOPHILIZED, FOR SOLUTION INTRAVENOUS at 08:02

## 2023-02-03 RX ADMIN — EPHEDRINE SULFATE 10 MG: 50 INJECTION INTRAVENOUS at 01:02

## 2023-02-03 RX ADMIN — MUPIROCIN: 20 OINTMENT TOPICAL at 08:02

## 2023-02-03 RX ADMIN — ACETAMINOPHEN 1000 MG: 500 TABLET ORAL at 01:02

## 2023-02-03 RX ADMIN — TRANEXAMIC ACID 1000 MG: 100 INJECTION, SOLUTION INTRAVENOUS at 09:02

## 2023-02-03 RX ADMIN — PROPOFOL 150 MG: 10 INJECTION, EMULSION INTRAVENOUS at 08:02

## 2023-02-03 RX ADMIN — EPHEDRINE SULFATE 10 MG: 50 INJECTION INTRAVENOUS at 10:02

## 2023-02-03 RX ADMIN — CEFAZOLIN 2 G: 2 INJECTION, POWDER, FOR SOLUTION INTRAMUSCULAR; INTRAVENOUS at 05:02

## 2023-02-03 RX ADMIN — ROCURONIUM BROMIDE 30 MG: 10 INJECTION INTRAVENOUS at 09:02

## 2023-02-03 RX ADMIN — ACETAMINOPHEN 1000 MG: 500 TABLET ORAL at 06:02

## 2023-02-03 RX ADMIN — APIXABAN 2.5 MG: 2.5 TABLET, FILM COATED ORAL at 09:02

## 2023-02-03 RX ADMIN — SODIUM CHLORIDE, SODIUM GLUCONATE, SODIUM ACETATE, POTASSIUM CHLORIDE, MAGNESIUM CHLORIDE, SODIUM PHOSPHATE, DIBASIC, AND POTASSIUM PHOSPHATE: .53; .5; .37; .037; .03; .012; .00082 INJECTION, SOLUTION INTRAVENOUS at 08:02

## 2023-02-03 RX ADMIN — ONDANSETRON 4 MG: 2 INJECTION INTRAMUSCULAR; INTRAVENOUS at 12:02

## 2023-02-03 RX ADMIN — LIDOCAINE HYDROCHLORIDE 100 MG: 20 INJECTION INTRAVENOUS at 08:02

## 2023-02-03 RX ADMIN — ALBUMIN HUMAN: 50 SOLUTION INTRAVENOUS at 08:02

## 2023-02-03 RX ADMIN — METHOCARBAMOL 500 MG: 500 TABLET ORAL at 01:02

## 2023-02-03 RX ADMIN — SENNOSIDES AND DOCUSATE SODIUM 1 TABLET: 50; 8.6 TABLET ORAL at 09:02

## 2023-02-03 RX ADMIN — ACETAMINOPHEN 1000 MG: 325 TABLET ORAL at 05:02

## 2023-02-03 RX ADMIN — CELECOXIB 100 MG: 100 CAPSULE ORAL at 01:02

## 2023-02-03 NOTE — ANESTHESIA PROCEDURE NOTES
Intubation    Date/Time: 2/3/2023 8:49 AM  Performed by: Hung Peacock CRNA  Authorized by: Anirudh Bobby III, MD     Intubation:     Induction:  Intravenous    Intubated:  Postinduction    Mask Ventilation:  Easy mask    Attempts:  1    Attempted By:  CRNA    Method of Intubation:  Video laryngoscopy    Blade:  Corbett 3    Laryngeal View Grade: Grade I - full view of cords      Difficult Airway Encountered?: No      Complications:  None    Airway Device:  Oral endotracheal tube    Airway Device Size:  7.5    Style/Cuff Inflation:  Cuffed (inflated to minimal occlusive pressure)    Tube secured:  22    Secured at:  The lips    Placement Verified By:  Capnometry and Fiber optic visualization    Complicating Factors:  None    Findings Post-Intubation:  BS equal bilateral and atraumatic/condition of teeth unchanged    
previous_has_had_previous_microdermabrasion
When Outside In The Sun, Do You...: always burns, never tans

## 2023-02-03 NOTE — NURSING
Pt arrived to unit via bed from ED. Vital signs as charted. Safety measures in place, bed locked and in lowest position, call light within reach, siderails up x2. 2L nc on arrival. Skin intact. Oshea in place. Pain assessed, 0/10 at this time. Son at bedside to translate.

## 2023-02-03 NOTE — NURSING TRANSFER
Nursing Transfer Note      2/3/2023     Reason patient is being transferred: Meets criteria    Transfer To: 543    Transfer via bed    Transfer with cardiac monitoring    Transported by transport    Medicines sent: none    Any special needs or follow-up needed: none    Chart send with patient: Yes    Notified: son    Patient reassessed at: 2/3

## 2023-02-03 NOTE — HPI
81 yo M with PMHx of COPD on 2L home oxygen who presents for evaluation of R hip fracture noted on outpatient imaging. Pt. Son at bedside assists with history. Pt. Currently visiting his son, lives in [facility]. Yesterday evening, the patient tripped trying to get by a dog gate in his son's house and fell backwards onto his buttocks. He noted immeidate pain and difficulty bearing weight, but he was able to walk with asssitance, and his pain improved with ice and ibuprofen. The patient's pain continued to worsen today, however, particularly on his R hip, so his son took him for further evaluation. Outpatient X-rays were positive for    Acute mildly displaced right femoral neck fracture and patient was intructed to come to ED. At baseline, pt. Is independent and ambulatory. No reported SOB, chest pain, lightheadedness, or LOC that contributed to patient's fall yesterday.    In the ED, ortho was consulted. They are planning on surgery.  NPO since midnight  - Pt marked, booked, and consented for surgery  -DVT PPx: Hold anticoagulation  -Abx: Preop abx ordered  -Hgb 13.6, 1u pRBC on hold  - owens in place, UA with leukocytes, given 1g of rocephin  - Iv: ordered for contralateral arm

## 2023-02-03 NOTE — ASSESSMENT & PLAN NOTE
-Orthopedic surgery consulted and plan to take patient to the OR tomorrow. Pt. NPO at midnight for surgical repair of hip fracture  -Pain control as per Hip Fracture Pathway with multimodal pain regimen with scheduled Tylenol and Lyrica 75 mg po nightly. Robaxin, oxycodone PRN. IV morphine PRN for pain not controlled by PO medications  -DVT prophylaxis pre-op with TEDs/SCDs.   -Check Vitamin D level to assess for Vitamin D deficiency due to concern for osteoporotic fracture.   -Plan to monitor daily electrolytes and H/H post-op.   -Start Lovenox 40 mg subcutaneous daily post-op after surgery for DVT prophylaxis and will need for a total of 28 days after hip fracture surgery  -Consult PT/OT post-op for gait training and strengthening and restoration of ADLs.   -Consult  and case management to assist with discharge planning for this patient after surgery.      Preoperative Cardiac evaluation  Cardiovascular Risk Assessment:  Non-emergent surgery.  No active cardiac problems (such as unstable angina, decompensated heart failure, significant uncontrolled arrhythmias or severe valvular disease).  Intermediate risk surgery.  Functional Status: He IS NOT able to climb a flight of stairs (> 4 METS) with no CP or SOB  His revised cardiac risk index is 0.     1 pt Each: Ischemic Heart Disease, Cerebrovascular Disease,                     CHF, DM, Creatinine > 2           Other Issues: Chronic hypoxic respiratory failure, no acute isses. Ok to proceed with surgery without further cardiac testing or medical optimization    2/2 - to OR today

## 2023-02-03 NOTE — ASSESSMENT & PLAN NOTE
-Pt. With chronic COPD on 2L NC. Currently stable, CXR without any acute process  -Duo-nebs PRN, continue home ICS/LAMA monitor with intermitent pulse oximetry  2/3- oxygen

## 2023-02-03 NOTE — CONSULTS
[facility] - Emergency Dept  Orthopedics  Consult Note    Patient Name: Agustin Hernandes  MRN: [id number]  Admission Date: 2/2/2023  Hospital Length of Stay: 0 days  Attending Provider: Alexa Ibarra MD  Primary Care Provider: Susy Oneil MD    Patient information was obtained from patient and relative(s).     Inpatient consult to Orthopedic Surgery  Consult performed by: Tam Wong MD  Consult ordered by: Tarun Juarez MD      Subjective:     Principal Problem:Closed displaced fracture of right femoral neck    Chief Complaint:   Chief Complaint   Patient presents with    Fall     Fall yesterday, denies LOC, x-ray today right hip fracture - hx of COPD on O2 at times         HPI: Agustin Hernandes is a 82 y.o. male with PMH of COPD presenting with right hip pain. Patient was walking when he tripped over a dog gate and fell onto his right hip. Immediate pain and difficulty bearing weight. Denies head injury or LOC. He does not take any blood thinners. He is from [place] visiting his son. Denies other MSK pains. Denies numbness, tingling, or paresthesias to the RLE. Lives with his wife, [place] Does not use assistive device for ambulation. 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 He is on oxygen at times at home for COPD. Denies ETOH, tobacco, or ilicit drugs.       No past medical history on file.    No past surgical history on file.    Review of patient's allergies indicates:  No Known Allergies    Current Facility-Administered Medications   Medication    0.9%  NaCl infusion    acetaminophen tablet 1,000 mg    bisacodyL suppository 10 mg    melatonin tablet 6 mg    methocarbamoL tablet 500 mg    morphine injection 2 mg    ondansetron injection 4 mg    oxyCODONE immediate release tablet 10 mg    oxyCODONE immediate release tablet 5 mg    pregabalin capsule 75 mg    sodium chloride 0.9% flush 10 mL    sodium chloride 0.9% flush 10 mL     No  current outpatient medications on file.     Family History    None       Tobacco Use    Smoking status: Not on file    Smokeless tobacco: Not on file   Substance and Sexual Activity    Alcohol use: Not on file    Drug use: Not on file    Sexual activity: Not on file     ROS  Constitutional: negative for fevers  Eyes: negative visual changes  ENT: negative for hearing loss  Respiratory: negative for dyspnea  Cardiovascular: negative for chest pain  Gastrointestinal: negative for abdominal pain  Genitourinary: negative for dysuria  Neurological: negative for headaches  Behavioral/Psych: negative for hallucinations  Endocrine: negative for temperature intolerance      Objective:     Vital Signs (Most Recent):  Temp: 98.4 °F (36.9 °C) (02/02/23 1641)  Pulse: 63 (02/02/23 1747)  Resp: (!) 30 (02/02/23 1804)  BP: (!) 116/57 (02/02/23 1747)  SpO2: 95 % (02/02/23 1747)   Vital Signs (24h Range):  Temp:  [98.4 °F (36.9 °C)] 98.4 °F (36.9 °C)  Pulse:  [63-84] 63  Resp:  [20-30] 30  SpO2:  [93 %-95 %] 95 %  BP: ()/(51-57) 116/57     Weight: 82.6 kg (182 lb)  Height: 6' (182.9 cm)  Body mass index is 24.68 kg/m².    No intake or output data in the 24 hours ending 02/02/23 2039    Ortho/SPM Exam  General:  no acute distress, appears stated age   Neuro: alert and oriented x3  Psych: normal mood  Head: normocephalic, atraumatic.  Eyes: no scleral icterus  Mouth: moist mucous membranes  Cardiovascular: extremities warm and well perfused  Lungs: breathing comfortably, equal chest rise bilat  Skin: clean, dry, intact (any exceptions noted in below musculoskeletal exam)    MSK:  RUE:  - Skin intact throughout, no open wounds  - No swelling  - No ecchymosis, erythema, or signs of cellulitis  - NonTTP throughout  - AROM and PROM of the shoulder, elbow, wrist, and hand intact without pain  - Axillary/AIN/PIN/Radial/Median/Ulnar Nerves assessed in isolation without deficit  - SILT throughout  - Compartments soft  - Radial artery  palpated   - Capillary Refill <3s    LUE:  - Skin intact throughout, no open wounds  - No swelling  - No ecchymosis, erythema, or signs of cellulitis  - NonTTP throughout  - AROM and PROM of the shoulder, elbow, wrist, and hand intact without pain  - Axillary/AIN/PIN/Radial/Median/Ulnar Nerves assessed in isolation without deficit  - SILT throughout  - Compartments soft  - Radial artery palpated   - Capillary Refill <3s    RLE:  - Skin intact throughout, no scars present, RLE shortened and externally rotated  - No swelling  - No ecchymosis, erythema, or signs of cellulitis  - TTP at hip/proximal femur  - No tenderness to palpation of middle or distal femur  - No tenderness to palpation of proximal, middle, or distal aspects of tibia or fibula  - No tenderness to palpation of foot  - Pain with any ROM at hip  - Full painless ROM of knee and ankle  - Compartments soft  - SILT Sa/Montoya/DP/SP/T  - Motor intact EHL/FHL/TA/Gastroc  - 2+ DP  - Brisk capillary refill      LLE:  - Skin intact throughout, no open wounds  - No swelling  - No ecchymosis, erythema, or signs of cellulitis  - NonTTP throughout  - AROM and PROM of the hip, knee, ankle, and foot intact without pain  - TA/EHL/Gastroc/FHL assessed in isolation without deficit  - SILT throughout  - Compartments soft  - DP and PT palpated  - Capillary Refill <3s  - Negative Log roll  - [facility]    Spine/pelvis/axial body:  No tenderness to palpation of cervical, thoracic, or lumbar spine  No pain with compression of pelvis  No chest wall or abdominal tenderness  No decubitus ulcers        Significant Labs: CBC:   Recent Labs   Lab 02/02/23  1740   WBC 11.54   HGB 14.7   HCT 46.3        CMP:   Recent Labs   Lab 02/02/23  1740      K 4.3      CO2 28   *   BUN 24*   CREATININE 0.9   CALCIUM 8.8   PROT 6.5   ALBUMIN 3.3*   BILITOT 1.3*   ALKPHOS 73   AST 18   ALT 18   ANIONGAP 5*     All pertinent labs within the past 24 hours have been  reviewed.    Significant Imaging: XR R hip: varus impacted femoral neck fracture    Assessment/Plan:     * Closed displaced fracture of right femoral neck  Agustin Hernandes is a 82 y.o. male with right varus impacted femoral neck fracture, closed, NVI. They are not anticoagulation at home. They do not require ambulatory assistive devices prior to this injury.     -Admitted to medicine hip fracture service for pre-operative clearance and medical evaluation  -To OR 2/3/23 for DHEERAJ vs hemiarthroplasty of femoral neck fracture fracture  -Pt marked, booked, and consented for surgery  -DVT PPx: Hold anticoagulation  -Abx: Preop abx ordered  -Labs: As above, hgb 14.7, wbc 11.5  -Other lab results pending  -Bed rest, owens, NPO at midnight  -Iv: ordered for contralateral arm    Patient was explained in detail the severity of the injury that was suffered. Patient was explained the risks/benefits/and alternatives to operative management in detail including infection, bleeding, pain, nerve and vascular damage, heterotopic ossification, leg length discrepancies, rotational deformities and they express full understanding.  We discussed the 30% national first year mortality rate with hip fractures, the need for early mobilization, and the expected rehab course.  He expresses full understanding of the condition and expresses that they want to proceed with surgery. The patient is admitted to the medicine hip fracture service for optimization of medical comorbidities. Will plan for OR 2/3/23. No guarantees were made, informed consent was obtained. All questions were answered to patient's and family's satisfaction. Consent was obtained with a certified .          Tam Wong MD  Orthopedics  [facility] - Emergency Dept

## 2023-02-03 NOTE — OP NOTE
OP NOTE    DOS:  02/03/2023    Preop Dx: Displaced right femoral neck fracture    Postop Dx: Displaced right femoral neck fracture    Procedure: Right total hip arthroplasty, anterior approach - 51657    Surgeon: Tam Nunez M.D.    Asst:  Rosendo Mack M.D, Kobe Sloan MD    Anesthesia: GETA    EBL:  800 cc    IVF:  2500 cc crystalloid, 500 cc albumin    Implants: [place] trident 2 cluster acetabular shell, 56 mm with highly crosslink polyethylene insert.  Jori cement 6 femoral stem, 130° with +5 36 mm head    Specimens: None    Findings: Very good length and stability.  Oozy.  Aquamantys used for bleeding.    Dispo:  To PACU extubated/stable       Indications for Procedure:      82-year-old highly active male visiting from [place] tripped over a dog resulting in a displaced right femoral neck fracture.  Patient is still rides horses and runs a farm.  After much discussion he would like a total hip arthroplasty and I feel the anterior approach was best for him given his activity level.  However, I also feel it a cemented femoral stem is in order.  We are proceeding to the operating room for right total hip arthroplasty.  The risks, benefits and alternatives to surgery were discussed at length with the patient prior to going to the operating room.  Informed consent was obtained.    Procedure in Detail:    Patient was identified the preoperative holding area and the site was marked.  Patient was wheeled to the operating room and general endotracheal anesthesia induced on the patient's hospital bed.  Preoperative antibiotics were administered to include Ancef and vancomycin.  Patient was then placed onto the [place] fracture table with both lower extremities in traction boots and all bony prominences well padded.  The right hip and lower extremity were prepped and draped in sterile fashion.  A time-out was undertaken to confirm patient, side, site, surgery, surgeon and administration of preoperative antibiotics  and tranexamic acid.  All agreed we proceeded.      I made an the for an anterior incision approach to the hip.  I incised through the fascia overlying the tensor fascia dexter.  I then went through the base of that muscle and incised this fascia.  The vessels were then cauterized.  Patient was quite oozy and I elected to open the Aqua Mantis.  This was used for cautery.  I dissected around the inferior and superior neck.  Used a Benítez to remove the muscle off of the hip capsule and placed a retractor over the superior ramus.  A capsulotomy was performed anteriorly elucidating the femoral neck and head as well as the fracture.  I excised the anterior capsule.    This point I dissected around to the lesser trochanter and marked the site for my femoral neck cut.  Femoral neck cut was made.  I released the soft tissues in the saddle.  I then placed a corkscrew into the femoral head.  With some difficulty this was then removed.  This was about 53 mm.  At this point I placed the femoral hook and elevated the femur.  Given the fact that I was using a cemented arthroplasty this will be more difficult with straight instruments.  I elected to broach with a short Press-Fit stem broaches.  I used a rongeur to lateralize I then sequentially broached with the [employer] broaches to a size 4.  I planed the calcar.  I left the broach in place and turned my attention to the acetabulum.      Acetabular retractors were placed in the labrum was excised.  I reamed the acetabulum and 56 mm was appropriate.  I checked the size and position under fluoroscopy.  I then placed a 56 mm trident 2 cluster hole acetabular cup in appropriate abduction angle and anteversion checking under fluoroscopy.  I then placed 2 screws.  I then placed the highly crosslink polyethylene into the cup.  Attention was then turned back to the femur.    At this point I placed a trial femoral neck and head and reduced the hip into the acetabulum.  I checked under  fluoroscopy it looked like the leg lengths were near equal.  At this point the hip was then again dislocated and the Insignia broach was removed.  I held this next 2 several broaches for the Hay on cemented stem at seemed to be closest in fit to the size 6.  Given my inability to get the straight broach down the canal safely I then placed a reaming thomas and reamed up to 11 mm with a length of where the stem would go.  We then opened the cement 6 stem to ensure that it could get down the canal.  It fit well.  At this point I placed a cement plug using the Manuelito flexible cement plug in [facility] to be able to put it in through this anterior approach.  The canal was brushed and irrigated.  The cement 6 stem was placed and held in the appropriate anteversion until the cement dried.    This point based on lengths with the broach I placed a +5, 36 mm head trial and reduced the hip in the acetabulum.  At this point had very clinical leg lengths and stability.  The hip was again dislocated and the final head was placed.  The hip was then brought back into reduction and again checked under fluoroscopy with good leg lengths and offset.  The leg was then brought down in significant extension and external rotation up to 100° still resulted in a stable hip.  Leg was then brought back up.    The wound was copiously irrigated with normal saline solution.  A 17/500 mL Betadine bath was then allowed to sit for 5 minutes.  Pulse lavaged with normal saline was again undertaken.  Another 1 g of tranexamic acid was administered IV.  A DELICIA block was then placed along with 1 g vancomycin and 2 g cefepime powder.  The fascia was then closed with 1. Vicryl suture over the fascia dexter.  The next layer fascia was closed 0 Vicryl suture, the subcutaneous tissue with 2-0 Vicryl suture and the skin with 3-0 Monocryl suture and Dermabond.  A sterile silver lined dressing was then applied.      All instrument sponge counts were reported correct at  the end of the case.  There were no complications.  The patient was returned to a supine position on the hospital bed, extubated, awakened and taken to recovery room stable condition.      Plan the patient:     Patient will be weight-bearing as tolerated with physical occupational therapy.  Avoid extremes of motion but no specific hip precautions.  Multimodal pain management limiting narcotics.  Anticoagulation times 4-6 weeks.    Tam Nunez MD

## 2023-02-03 NOTE — NURSING
Nurses Note -- 4 Eyes      2/3/2023   1:05 AM      Skin assessed during: Admit      [x] No Pressure Injuries Present    []Prevention Measures Documented      [] Yes- Altered Skin Integrity Present or Discovered   [] LDA Added if Not in Epic (Describe Wound)   [] New Altered Skin Integrity was Present on Admit and Documented in LDA   [] Wound Image Taken    Wound Care Consulted? No    Attending Nurse:  Tamia Johnson RN     Second RN/Staff Member:  Benjamín Jo RN

## 2023-02-03 NOTE — ASSESSMENT & PLAN NOTE
-Pt. With chronic COPD on 2L NC. Currently stable, CXR without any acute process  -Duo-nebs PRN, continue home ICS/LAMA monitor with intermitent pulse oximetry

## 2023-02-03 NOTE — PROGRESS NOTES
[facility]  Orthopedics  Progress Note    Patient Name: Agustin Hernandes  MRN: [id number]  Admission Date: 2/2/2023  Hospital Length of Stay: 1 days  Attending Provider: Alexa Ibarra MD  Primary Care Provider: Susy Oneil MD  Follow-up For: Procedure(s) (LRB):  ARTHROPLASTY, HIP, TOTAL, ANTERIOR APPROACH (Right)    Post-Operative Day:    Subjective:     Principal Problem:Closed displaced fracture of right femoral neck    Principal Orthopedic Problem: same as above    Interval History: NAEO. VSS. Patient resting comfortably this morning. Pain well controlled. No concerns. Plan for OR today.    Review of patient's allergies indicates:  No Known Allergies    Current Facility-Administered Medications   Medication    0.9%  NaCl infusion    acetaminophen tablet 1,000 mg    bisacodyL suppository 10 mg    fluticasone furoate-vilanteroL 100-25 mcg/dose diskus inhaler 1 puff    melatonin tablet 6 mg    methocarbamoL tablet 500 mg    morphine injection 2 mg    ondansetron injection 4 mg    oxyCODONE immediate release tablet 10 mg    oxyCODONE immediate release tablet 5 mg    pregabalin capsule 75 mg    sodium chloride 0.9% flush 10 mL    sodium chloride 0.9% flush 10 mL     Objective:     Vital Signs (Most Recent):  Temp: 98.6 °F (37 °C) (02/03/23 0122)  Pulse: 62 (02/03/23 0122)  Resp: 16 (02/03/23 0122)  BP: (!) 95/53 (02/03/23 0122)  SpO2: (!) 94 % (02/03/23 0122)   Vital Signs (24h Range):  Temp:  [98 °F (36.7 °C)-98.6 °F (37 °C)] 98.6 °F (37 °C)  Pulse:  [60-84] 62  Resp:  [16-30] 16  SpO2:  [93 %-95 %] 94 %  BP: ()/(50-57) 95/53     Weight: 82.6 kg (182 lb)  Height: 6' (182.9 cm)  Body mass index is 24.68 kg/m².    No intake or output data in the 24 hours ending 02/03/23 0645    Ortho/SPM Exam  RLE  - TTP right hip  - RLE shortened and externally rotated  - Compartments soft and compressible  - ROM full (eversion,inversion,plantar/dorsiflexion)  - TA/EHL/Gastroc/FHL assessed in  isolation without deficit  - SILT throughout  - DP and PT palpated  2+  - Capillary Refill <3s       Significant Labs: CBC:   Recent Labs   Lab 02/02/23  1740 02/03/23  0344   WBC 11.54 8.98   HGB 14.7 13.6*   HCT 46.3 43.0    175     CMP:   Recent Labs   Lab 02/02/23  1740 02/03/23  0344    137   K 4.3 4.1    107   CO2 28 22*   * 115*   BUN 24* 18   CREATININE 0.9 0.7   CALCIUM 8.8 8.2*   PROT 6.5 5.6*   ALBUMIN 3.3* 2.9*   BILITOT 1.3* 1.2*   ALKPHOS 73 65   AST 18 15   ALT 18 14   ANIONGAP 5* 8     All pertinent labs within the past 24 hours have been reviewed.    Significant Imaging: I have reviewed and interpreted all pertinent imaging results/findings.    Assessment/Plan:     * Closed displaced fracture of right femoral neck  Agustin Hernandes is a 82 y.o. male with right varus impacted femoral neck fracture, closed, NVI. They are not anticoagulation at home. They do not require ambulatory assistive devices prior to this injury.  Plan for OR today for R anterior DHEERAJ vs hemiarthroplasty    - NPO since midnight  - Pt marked, booked, and consented for surgery  -DVT PPx: Hold anticoagulation  -Abx: Preop abx ordered  -Hgb 13.6, 1u pRBC on hold  - owens in place, UA with leukocytes, given 1g of rocephin  - Iv: ordered for contralateral arm          Tam Wong MD  Orthopedics  [facility] - Surgery

## 2023-02-03 NOTE — ASSESSMENT & PLAN NOTE
Agustin Hernandes is a 82 y.o. male with right varus impacted femoral neck fracture, closed, NVI. They are not anticoagulation at home. They do not require ambulatory assistive devices prior to this injury.  Plan for OR today for R anterior DHEERAJ vs hemiarthroplasty    - NPO since midnight  - Pt marked, booked, and consented for surgery  -DVT PPx: Hold anticoagulation  -Abx: Preop abx ordered  -Hgb 13.6, 1u pRBC on hold  - owens in place, UA with leukocytes, given 1g of rocephin  - Iv: ordered for contralateral arm

## 2023-02-03 NOTE — TRANSFER OF CARE
"Anesthesia Transfer of Care Note    Patient: Agustin Hernandes    Procedure(s) Performed: Procedure(s) (LRB):  ARTHROPLASTY, HIP, TOTAL, ANTERIOR APPROACH (Right)    Patient location: PACU    Anesthesia Type: general    Transport from OR: Transported from OR on 6-10 L/min O2 by face mask with adequate spontaneous ventilation    Post pain: adequate analgesia    Post assessment: no apparent anesthetic complications and tolerated procedure well    Post vital signs: stable    Level of consciousness: awake    Nausea/Vomiting: no nausea/vomiting    Complications: none    Transfer of care protocol was followed      Last vitals:   Visit Vitals  BP (!) 111/56 (BP Location: Left arm, Patient Position: Lying)   Pulse 65   Temp 37.3 °C (99.1 °F) (Temporal)   Resp 14   Ht 5' 11.26" (1.81 m)   Wt 62 kg (136 lb 11 oz)   SpO2 (!) 94%   BMI 18.92 kg/m²     "

## 2023-02-03 NOTE — ANESTHESIA PREPROCEDURE EVALUATION
[facility]  Anesthesia Pre-Operative Evaluation         Patient Name/: Agustin Hernandes, 1940  MRN: [id number]    SUBJECTIVE:     Pre-operative evaluation for Procedure(s) (LRB):  ARTHROPLASTY, HIP, TOTAL, ANTERIOR APPROACH (Right)     2023    Agustin Hernandes is a 82 y.o. male w/ a significant PMHx of COPD on 2L home oxygen admitted w/ a right varus impacted femoral neck fracture.    Patient now presents for the above procedure(s).      Prev airway: None documented.    LDA:        Peripheral IV - Single Lumen 23 20 G Right Antecubital (Active)   Site Assessment Clean;Dry;Intact 23   Line Status Blood return noted 23   Dressing Status Clean;Dry;Intact 23   Dressing Intervention First dressing 23   Number of days: 0       Drips:    sodium chloride 0.9% 100 mL/hr at 23 2353       Patient Active Problem List   Diagnosis    Closed displaced fracture of right femoral neck    Age-related osteoporosis with current pathological fracture    Chronic hypoxemic respiratory failure       Review of patient's allergies indicates:  No Known Allergies    Current Inpatient Medications:    acetaminophen  1,000 mg Oral Q8H    cefTRIAXone (ROCEPHIN) IVPB  1 g Intravenous Once    fluticasone furoate-vilanteroL  1 puff Inhalation Daily    pregabalin  75 mg Oral QHS       No current facility-administered medications on file prior to encounter.     Current Outpatient Medications on File Prior to Encounter   Medication Sig Dispense Refill    ALBUTEROL INHL Inhale into the lungs.      budesonide (PULMICORT FLEXHALER) 90 mcg/actuation AePB Inhale 2 puffs into the lungs. Controller      umeclidinium-vilanteroL (ANORO ELLIPTA) 62.5-25 mcg/actuation DsDv Inhale into the lungs. Controller         No past surgical history on file.    Social History:  Tobacco Use: Not on file       Alcohol Use: Not on file       OBJECTIVE:     Vital Signs  Range:  BMI Readings from Last 1 Encounters:   02/02/23 24.68 kg/m²       Temp:  [36.7 °C (98 °F)-36.9 °C (98.4 °F)]   Pulse:  [60-84]   Resp:  [18-30]   BP: ()/(50-57)   SpO2:  [93 %-95 %]        Significant Labs:        Component Value Date/Time    WBC 11.54 02/02/2023 1740    HGB 14.7 02/02/2023 1740    HCT 46.3 02/02/2023 1740     02/02/2023 1740     02/02/2023 1740    K 4.3 02/02/2023 1740     02/02/2023 1740    CO2 28 02/02/2023 1740     (H) 02/02/2023 1740    BUN 24 (H) 02/02/2023 1740    CREATININE 0.9 02/02/2023 1740    CALCIUM 8.8 02/02/2023 1740    ALBUMIN 3.3 (L) 02/02/2023 1740    PROT 6.5 02/02/2023 1740    ALKPHOS 73 02/02/2023 1740    BILITOT 1.3 (H) 02/02/2023 1740    AST 18 02/02/2023 1740    ALT 18 02/02/2023 1740    INR 1.0 02/02/2023 1740    HGBA1C 5.9 (H) 02/02/2023 1740        Please see Results Review for additional labs.     Diagnostic Studies: No relevant studies.    EKG:   No results found for this or any previous visit.    ECHO:  No results found for this or any previous visit.        ASSESSMENT/PLAN:         Pre-op Assessment    I have reviewed the Patient Summary Reports.     I have reviewed the Nursing Notes. I have reviewed the NPO Status.   I have reviewed the Medications.     Review of Systems  Pulmonary:   COPD        Physical Exam  General: Well nourished, Cooperative, Alert and Oriented    Airway:  Mallampati: II   Mouth Opening: Normal  Neck ROM: Normal ROM        Anesthesia Plan  Type of Anesthesia, risks & benefits discussed:    Anesthesia Type: Regional, Gen Natural Airway, Gen ETT  Intra-op Monitoring Plan: Standard ASA Monitors  Post Op Pain Control Plan: multimodal analgesia and IV/PO Opioids PRN  Induction:  IV  Airway Plan: Direct, Post-Induction  Informed Consent: Informed consent signed with the Patient and all parties understand the risks and agree with anesthesia plan.  All questions answered.   ASA Score: 3  Day of Surgery Review of  History & Physical: H&P Update referred to the surgeon/provider.    Ready For Surgery From Anesthesia Perspective.     .

## 2023-02-03 NOTE — ASSESSMENT & PLAN NOTE
-Orthopedic surgery consulted and plan to take patient to the OR tomorrow. Pt. NPO at midnight for surgical repair of hip fracture  -Pain control as per Hip Fracture Pathway with multimodal pain regimen with scheduled Tylenol and Lyrica 75 mg po nightly. Robaxin, oxycodone PRN. IV morphine PRN for pain not controlled by PO medications  -DVT prophylaxis pre-op with TEDs/SCDs.   -Check Vitamin D level to assess for Vitamin D deficiency due to concern for osteoporotic fracture.   -Plan to monitor daily electrolytes and H/H post-op.   -Start Lovenox 40 mg subcutaneous daily post-op after surgery for DVT prophylaxis and will need for a total of 28 days after hip fracture surgery  -Consult PT/OT post-op for gait training and strengthening and restoration of ADLs.   -Consult  and case management to assist with discharge planning for this patient after surgery.      Preoperative Cardiac evaluation  Cardiovascular Risk Assessment:  Non-emergent surgery.  No active cardiac problems (such as unstable angina, decompensated heart failure, significant uncontrolled arrhythmias or severe valvular disease).  Intermediate risk surgery.  Functional Status: He IS NOT able to climb a flight of stairs (> 4 METS) with no CP or SOB  His revised cardiac risk index is 0.     1 pt Each: Ischemic Heart Disease, Cerebrovascular Disease,                     CHF, DM, Creatinine > 2           Other Issues: Chronic hypoxic respiratory failure, no acute isses. Ok to proceed with surgery without further cardiac testing or medical optimization

## 2023-02-03 NOTE — PHARMACY MED REC
"            Admission Medication History     The home medication history was taken by Shahid Yung.    You may go to "Admission" then "Reconcile Home Medications" tabs to review and/or act upon these items.     The home medication list has been updated by the Pharmacy department.   Please read ALL comments highlighted in yellow.   Please address this information as you see fit.    Feel free to contact us if you have any questions or require assistance.        PT HAS NO RECENT FILL HISTORY ON FILE      Potential issues to be addressed PRIOR TO DISCHARGE  Please discuss with the patient barriers to adherence with medication treatment plans  Patient requires education regarding drug therapies     Shahid Yung  EXT [id number]      .        "

## 2023-02-03 NOTE — PLAN OF CARE
[facility] - [facility] (2nd Fl)  Initial Discharge Assessment       Primary Care Provider: Susy Oneil MD    Admission Diagnosis: Pre-op chest exam [Z01.811]  Closed fracture of neck of right femur, initial encounter [S72.001A]  Closed displaced fracture of right femoral neck [S72.001A]    Admission Date: 2/2/2023  Expected Discharge Date: 2/6/2023    Discharge Barriers Identified: [employer]    Payor: /     Extended Emergency Contact Information  Primary Emergency Contact: Gatito aHrper  Mobile Phone: [phone]  Relation: Son  Preferred language: English   needed? No    Discharge Plan A: Home with family  Discharge Plan B: Home with family    No Pharmacies Listed    Initial Assessment (most recent)       Adult Discharge Assessment - 02/03/23 1438          Discharge Assessment    Assessment Type Discharge Planning Assessment     Confirmed/corrected address, phone number and insurance Yes     Confirmed Demographics Correct on Facesheet     Source of Information family   Gatito Harper - son    Communicated GABRIELA with patient/caregiver Yes     People in Home child(aurora), adult     Do you expect to return to your current living situation? Yes     Do you have help at home or someone to help you manage your care at home? Yes     Who are your caregiver(s) and their phone number(s)? son and daughter in law     Home Layout Able to live on 1st floor     Equipment Currently Used at Home none     Do you currently have service(s) that help you manage your care at home? No     Do you take prescription medications? Yes     Do you have prescription coverage? No     Do you have any problems affording any of your prescribed medications? No     Is the patient taking medications as prescribed? yes     How do you get to doctors appointments? family or friend will provide     Are you on dialysis? No     Do you take coumadin? No     Discharge Plan A Home with family     Discharge Plan B Home with family     Discharge Plan  discussed with: Adult children   Gatito Harper - tod    Discharge Barriers Identified Unisured                   Patient remains in surgery, assessment obtained from Gatito Harper - tod.    Patient lives with his son Gatito Harper and daughter in law in a two story home with patient able to stay on first floor. Patient has medical coverage including medications in [facility], but does not have health insurance in the [place].     Request sent to [facility] for Medicaid eligibility review

## 2023-02-03 NOTE — PROGRESS NOTES
[facility] Medicine  Progress Note    Patient Name: Agustin Hernandes  MRN: [id number]  Patient Class: IP- Inpatient   Admission Date: 2/2/2023  Length of Stay: 1 days  Attending Physician: Alexa Ibarra MD  Primary Care Provider: Susy Oneil MD        Subjective:     Principal Problem:Closed displaced fracture of right femoral neck        HPI:  81 yo M with PMHx of COPD on 2L home oxygen who presents for evaluation of R hip fracture noted on outpatient imaging. Pt. Son at bedside assists with history. Pt. Currently visiting his son, lives in [facility]. Yesterday evening, the patient tripped trying to get by a dog gate in his son's house and fell backwards onto his buttocks. He noted immeidate pain and difficulty bearing weight, but he was able to walk with asssitance, and his pain improved with ice and ibuprofen. The patient's pain continued to worsen today, however, particularly on his R hip, so his son took him for further evaluation. Outpatient X-rays were positive for    Acute mildly displaced right femoral neck fracture and patient was intructed to come to ED. At baseline, pt. Is independent and ambulatory. No reported SOB, chest pain, lightheadedness, or LOC that contributed to patient's fall yesterday.    In the ED, ortho was consulted. They are planning on surgery.  NPO since midnight  - Pt marked, booked, and consented for surgery  -DVT PPx: Hold anticoagulation  -Abx: Preop abx ordered  -Hgb 13.6, 1u pRBC on hold  - owens in place, UA with leukocytes, given 1g of rocephin  - Iv: ordered for contralateral arm      Overview/Hospital Course:  2/3 - BP 99/61 VSS. Saw pt in post op setting at 5 pm. Doing well. Wife at bedside they need a . BP 97/55 VSS,  SpO2 (!) 92% on 2 liters (home dose).  Ate dinner. Looks comfortable. PNC in place.       Interval History: see above    Review of Systems   Constitutional:  Positive for activity change.   HENT:  Negative for trouble  swallowing.    Respiratory:  Negative for cough, choking and shortness of breath.    Cardiovascular:  Negative for chest pain and leg swelling.   Gastrointestinal:  Negative for blood in stool, constipation, diarrhea and vomiting.   Genitourinary:  Negative for difficulty urinating and dysuria.   Musculoskeletal:  Positive for arthralgias (right hip) and gait problem. Negative for back pain and neck stiffness.   Neurological:  Negative for numbness.   Psychiatric/Behavioral:  Negative for behavioral problems and confusion.    Objective:     Vital Signs (Most Recent):  Temp: 98.8 °F (37.1 °C) (02/03/23 0547)  Pulse: 63 (02/03/23 0547)  Resp: 17 (02/03/23 0547)  BP: 99/61 (02/03/23 0547)  SpO2: 95 % (02/03/23 0547) Vital Signs (24h Range):  Temp:  [98 °F (36.7 °C)-98.8 °F (37.1 °C)] 98.8 °F (37.1 °C)  Pulse:  [60-84] 63  Resp:  [16-30] 17  SpO2:  [93 %-95 %] 95 %  BP: ()/(50-61) 99/61     Weight: 82.6 kg (182 lb)  Body mass index is 24.68 kg/m².    Intake/Output Summary (Last 24 hours) at 2/3/2023 0739  Last data filed at 2/3/2023 0437  Gross per 24 hour   Intake --   Output 400 ml   Net -400 ml      Physical Exam  Constitutional:       General: He is not in acute distress.     Appearance: Normal appearance. He is not ill-appearing, toxic-appearing or diaphoretic.   HENT:      Head: Normocephalic and atraumatic.      Nose: Nose normal.      Mouth/Throat:      Mouth: Mucous membranes are moist.      Pharynx: Oropharynx is clear.   Eyes:      General: No scleral icterus.     Extraocular Movements: Extraocular movements intact.      Conjunctiva/sclera: Conjunctivae normal.      Pupils: Pupils are equal, round, and reactive to light.   Cardiovascular:      Rate and Rhythm: Normal rate and regular rhythm.      Pulses: Normal pulses.      Heart sounds: Normal heart sounds. No murmur heard.  Pulmonary:      Effort: Pulmonary effort is normal. No respiratory distress.      Breath sounds: Normal breath sounds. No  stridor. No wheezing, rhonchi or rales.   Chest:      Chest wall: No tenderness.   Abdominal:      General: Abdomen is flat. Bowel sounds are normal. There is no distension.      Palpations: Abdomen is soft. There is no mass.      Tenderness: There is no abdominal tenderness. There is no right CVA tenderness, guarding or rebound.   Musculoskeletal:         General: Signs of injury (right hip) present. No swelling, tenderness or deformity. Normal range of motion.      Cervical back: Normal range of motion and neck supple. No rigidity or tenderness.      Right lower leg: No edema.      Left lower leg: No edema.      Comments: Right hip scar dressing is clean and intact.    Skin:     General: Skin is warm and dry.      Coloration: Skin is not jaundiced.      Findings: No erythema or rash.   Neurological:      General: No focal deficit present.      Mental Status: He is alert and oriented to person, place, and time. Mental status is at baseline.      Motor: No weakness.   Psychiatric:         Mood and Affect: Mood normal.         Behavior: Behavior normal.         Thought Content: Thought content normal.         Judgment: Judgment normal.       Significant Labs: All pertinent labs within the past 24 hours have been reviewed.  CBC:   Recent Labs   Lab 02/02/23  1740 02/03/23  0344   WBC 11.54 8.98   HGB 14.7 13.6*   HCT 46.3 43.0    175     CMP:   Recent Labs   Lab 02/02/23  1740 02/03/23  0344    137   K 4.3 4.1    107   CO2 28 22*   * 115*   BUN 24* 18   CREATININE 0.9 0.7   CALCIUM 8.8 8.2*   PROT 6.5 5.6*   ALBUMIN 3.3* 2.9*   BILITOT 1.3* 1.2*   ALKPHOS 73 65   AST 18 15   ALT 18 14   ANIONGAP 5* 8       Significant Imaging: I have reviewed all pertinent imaging results/findings within the past 24 hours.      Assessment/Plan:      * Closed displaced fracture of right femoral neck  -Orthopedic surgery consulted and plan to take patient to the OR tomorrow. Pt. NPO at midnight for surgical  repair of hip fracture  -Pain control as per Hip Fracture Pathway with multimodal pain regimen with scheduled Tylenol and Lyrica 75 mg po nightly. Robaxin, oxycodone PRN. IV morphine PRN for pain not controlled by PO medications  -DVT prophylaxis pre-op with TEDs/SCDs.   -Check Vitamin D level to assess for Vitamin D deficiency due to concern for osteoporotic fracture.   -Plan to monitor daily electrolytes and H/H post-op.   -Start Lovenox 40 mg subcutaneous daily post-op after surgery for DVT prophylaxis and will need for a total of 28 days after hip fracture surgery  -Consult PT/OT post-op for gait training and strengthening and restoration of ADLs.   -Consult  and case management to assist with discharge planning for this patient after surgery.      Preoperative Cardiac evaluation  Cardiovascular Risk Assessment:  Non-emergent surgery.  No active cardiac problems (such as unstable angina, decompensated heart failure, significant uncontrolled arrhythmias or severe valvular disease).  Intermediate risk surgery.  Functional Status: He IS NOT able to climb a flight of stairs (> 4 METS) with no CP or SOB  His revised cardiac risk index is 0.     1 pt Each: Ischemic Heart Disease, Cerebrovascular Disease,                     CHF, DM, Creatinine > 2           Other Issues: Chronic hypoxic respiratory failure, no acute isses. Ok to proceed with surgery without further cardiac testing or medical optimization    2/2 - to OR today        Chronic hypoxemic respiratory failure  -Pt. With chronic COPD on 2L NC. Currently stable, CXR without any acute process  -Duo-nebs PRN, continue home ICS/LAMA monitor with intermitent pulse oximetry  2/3- oxygen    Age-related osteoporosis with current pathological fracture  See above  F/u [facility]      VTE Risk Mitigation (From admission, onward)           Ordered     apixaban tablet 2.5 mg  2 times daily         02/03/23 1245     Place sequential compression device   Until discontinued         02/03/23 0748     Place sequential compression device  Until discontinued         02/02/23 1937                    Discharge Planning   GABRIELA: 2/6/2023     Code Status: Full Code   Is the patient medically ready for discharge?: No    Reason for patient still in hospital (select all that apply): Patient trending condition  Discharge Plan A: Home with family        Alexa Ibarra MD  Senior Hospitalist  Department of Hospital Medicine  [facility] [address], [phone]    [facility]

## 2023-02-03 NOTE — SUBJECTIVE & OBJECTIVE
No past medical history on file.    No past surgical history on file.    Review of patient's allergies indicates:  No Known Allergies    Current Facility-Administered Medications   Medication    0.9%  NaCl infusion    acetaminophen tablet 1,000 mg    bisacodyL suppository 10 mg    melatonin tablet 6 mg    methocarbamoL tablet 500 mg    morphine injection 2 mg    ondansetron injection 4 mg    oxyCODONE immediate release tablet 10 mg    oxyCODONE immediate release tablet 5 mg    pregabalin capsule 75 mg    sodium chloride 0.9% flush 10 mL    sodium chloride 0.9% flush 10 mL     No current outpatient medications on file.     Family History    None       Tobacco Use    Smoking status: Not on file    Smokeless tobacco: Not on file   Substance and Sexual Activity    Alcohol use: Not on file    Drug use: Not on file    Sexual activity: Not on file     ROS  Constitutional: negative for fevers  Eyes: negative visual changes  ENT: negative for hearing loss  Respiratory: negative for dyspnea  Cardiovascular: negative for chest pain  Gastrointestinal: negative for abdominal pain  Genitourinary: negative for dysuria  Neurological: negative for headaches  Behavioral/Psych: negative for hallucinations  Endocrine: negative for temperature intolerance      Objective:     Vital Signs (Most Recent):  Temp: 98.4 °F (36.9 °C) (02/02/23 1641)  Pulse: 63 (02/02/23 1747)  Resp: (!) 30 (02/02/23 1804)  BP: (!) 116/57 (02/02/23 1747)  SpO2: 95 % (02/02/23 1747)   Vital Signs (24h Range):  Temp:  [98.4 °F (36.9 °C)] 98.4 °F (36.9 °C)  Pulse:  [63-84] 63  Resp:  [20-30] 30  SpO2:  [93 %-95 %] 95 %  BP: ()/(51-57) 116/57     Weight: 82.6 kg (182 lb)  Height: 6' (182.9 cm)  Body mass index is 24.68 kg/m².    No intake or output data in the 24 hours ending 02/02/23 2039    Ortho/SPM Exam  General:  no acute distress, appears stated age   Neuro: alert and oriented x3  Psych: normal mood  Head: normocephalic, atraumatic.  Eyes: no scleral  icterus  Mouth: moist mucous membranes  Cardiovascular: extremities warm and well perfused  Lungs: breathing comfortably, equal chest rise bilat  Skin: clean, dry, intact (any exceptions noted in below musculoskeletal exam)    MSK:  RUE:  - Skin intact throughout, no open wounds  - No swelling  - No ecchymosis, erythema, or signs of cellulitis  - NonTTP throughout  - AROM and PROM of the shoulder, elbow, wrist, and hand intact without pain  - Axillary/AIN/PIN/Radial/Median/Ulnar Nerves assessed in isolation without deficit  - SILT throughout  - Compartments soft  - Radial artery palpated   - Capillary Refill <3s    LUE:  - Skin intact throughout, no open wounds  - No swelling  - No ecchymosis, erythema, or signs of cellulitis  - NonTTP throughout  - AROM and PROM of the shoulder, elbow, wrist, and hand intact without pain  - Axillary/AIN/PIN/Radial/Median/Ulnar Nerves assessed in isolation without deficit  - SILT throughout  - Compartments soft  - Radial artery palpated   - Capillary Refill <3s    RLE:  - Skin intact throughout, no scars present, RLE shortened and externally rotated  - No swelling  - No ecchymosis, erythema, or signs of cellulitis  - TTP at hip/proximal femur  - No tenderness to palpation of middle or distal femur  - No tenderness to palpation of proximal, middle, or distal aspects of tibia or fibula  - No tenderness to palpation of foot  - Pain with any ROM at hip  - Full painless ROM of knee and ankle  - Compartments soft  - SILT Sa/Montoya/DP/SP/T  - Motor intact EHL/FHL/TA/Gastroc  - 2+ DP  - Brisk capillary refill      LLE:  - Skin intact throughout, no open wounds  - No swelling  - No ecchymosis, erythema, or signs of cellulitis  - NonTTP throughout  - AROM and PROM of the hip, knee, ankle, and foot intact without pain  - TA/EHL/Gastroc/FHL assessed in isolation without deficit  - SILT throughout  - Compartments soft  - DP and PT palpated  - Capillary Refill <3s  - Negative Log roll  - Negative  [facility]    Spine/pelvis/axial body:  No tenderness to palpation of cervical, thoracic, or lumbar spine  No pain with compression of pelvis  No chest wall or abdominal tenderness  No decubitus ulcers        Significant Labs: CBC:   Recent Labs   Lab 02/02/23  1740   WBC 11.54   HGB 14.7   HCT 46.3        CMP:   Recent Labs   Lab 02/02/23  1740      K 4.3      CO2 28   *   BUN 24*   CREATININE 0.9   CALCIUM 8.8   PROT 6.5   ALBUMIN 3.3*   BILITOT 1.3*   ALKPHOS 73   AST 18   ALT 18   ANIONGAP 5*     All pertinent labs within the past 24 hours have been reviewed.    Significant Imaging: XR R hip: varus impacted femoral neck fracture

## 2023-02-03 NOTE — SUBJECTIVE & OBJECTIVE
No past medical history on file.    No past surgical history on file.    Review of patient's allergies indicates:  No Known Allergies    No current facility-administered medications on file prior to encounter.     No current outpatient medications on file prior to encounter.     Family History    No relevant family history reported       Tobacco Use    Smoking status: Not on file    Smokeless tobacco: Not on file   Substance and Sexual Activity    Alcohol use: Not on file    Drug use: Not on file    Sexual activity: Not on file     Review of Systems   Constitutional:  Negative for activity change, chills, fever and unexpected weight change.   HENT:  Negative for congestion and sore throat.    Respiratory:  Negative for cough, shortness of breath and wheezing.    Cardiovascular:  Negative for chest pain, palpitations and leg swelling.   Gastrointestinal:  Negative for abdominal pain, blood in stool, nausea and vomiting.   Genitourinary:  Negative for dysuria and hematuria.   Musculoskeletal:  Positive for arthralgias and gait problem. Negative for neck pain.   Skin:  Negative for color change and rash.   Neurological:  Negative for dizziness, seizures and numbness.   Psychiatric/Behavioral:  Negative for hallucinations and suicidal ideas.    Objective:     Vital Signs (Most Recent):  Temp: 98.4 °F (36.9 °C) (02/02/23 1641)  Pulse: 63 (02/02/23 1747)  Resp: (!) 30 (02/02/23 1804)  BP: (!) 116/57 (02/02/23 1747)  SpO2: 95 % (02/02/23 1747)   Vital Signs (24h Range):  Temp:  [98.4 °F (36.9 °C)] 98.4 °F (36.9 °C)  Pulse:  [63-84] 63  Resp:  [20-30] 30  SpO2:  [93 %-95 %] 95 %  BP: ()/(51-57) 116/57     Weight: 82.6 kg (182 lb)  Body mass index is 24.68 kg/m².    Physical Exam  Vitals reviewed.   Constitutional:       General: He is not in acute distress.     Appearance: He is well-developed.   HENT:      Head: Normocephalic and atraumatic.   Eyes:      Extraocular Movements: Extraocular movements intact.       Pupils: Pupils are equal, round, and reactive to light.   Neck:      Vascular: No JVD.      Trachea: No tracheal deviation.   Cardiovascular:      Rate and Rhythm: Normal rate and regular rhythm.      Heart sounds: No murmur heard.    No friction rub. No gallop.   Pulmonary:      Effort: No respiratory distress.      Breath sounds: Normal breath sounds. No wheezing or rales.   Abdominal:      General: Bowel sounds are normal. There is no distension.      Palpations: Abdomen is soft. There is no mass.      Tenderness: There is no abdominal tenderness.   Musculoskeletal:         General: Tenderness and deformity present.      Cervical back: Neck supple.   Lymphadenopathy:      Cervical: No cervical adenopathy.   Skin:     General: Skin is warm and dry.      Findings: No rash.   Neurological:      Mental Status: He is alert and oriented to person, place, and time.      Gait: Gait abnormal.         CRANIAL NERVES     CN III, IV, VI   Pupils are equal, round, and reactive to light.     Significant Labs: All pertinent labs within the past 24 hours have been reviewed.    Significant Imaging: I have reviewed all pertinent imaging results/findings within the past 24 hours.

## 2023-02-03 NOTE — HPI
Agustin Hernandes is a 82 y.o. male with PMH of COPD presenting with right hip pain. Patient was walking when he tripped over a dog gate and fell onto his right hip. Immediate pain and difficulty bearing weight. Denies head injury or LOC. He does not take any blood thinners. He is from [place] visiting his son. Denies other MSK pains. Denies numbness, tingling, or paresthesias to the RLE. Lives with his wife, [place]. Does not use assistive device for ambulation. 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 He is on oxygen at times at home for COPD. Denies ETOH, tobacco, or ilicit drugs.

## 2023-02-03 NOTE — ASSESSMENT & PLAN NOTE
Agustin Hernandes is a 82 y.o. male with right varus impacted femoral neck fracture, closed, NVI. They are not anticoagulation at home. They do not require ambulatory assistive devices prior to this injury.     -Admitted to medicine hip fracture service for pre-operative clearance and medical evaluation  -To OR 2/3/23 for DHEERAJ vs hemiarthroplasty of femoral neck fracture fracture  -Pt marked, booked, and consented for surgery  -DVT PPx: Hold anticoagulation  -Abx: Preop abx ordered  -Labs: As above, hgb 14.7, wbc 11.5  -Other lab results pending  -Bed rest, owens, NPO at midnight  -Iv: ordered for contralateral arm    Patient was explained in detail the severity of the injury that was suffered. Patient was explained the risks/benefits/and alternatives to operative management in detail including infection, bleeding, pain, nerve and vascular damage, heterotopic ossification, leg length discrepancies, rotational deformities and they express full understanding.  We discussed the 30% national first year mortality rate with hip fractures, the need for early mobilization, and the expected rehab course.  He expresses full understanding of the condition and expresses that they want to proceed with surgery. The patient is admitted to the medicine hip fracture service for optimization of medical comorbidities. Will plan for OR 2/3/23. No guarantees were made, informed consent was obtained. All questions were answered to patient's and family's satisfaction. Consent was obtained with a certified .

## 2023-02-03 NOTE — SUBJECTIVE & OBJECTIVE
Principal Problem:Closed displaced fracture of right femoral neck    Principal Orthopedic Problem: same as above    Interval History: NAEO. VSS. Patient resting comfortably this morning. No concerns. Plan for OR today.    Review of patient's allergies indicates:  No Known Allergies    Current Facility-Administered Medications   Medication    0.9%  NaCl infusion    acetaminophen tablet 1,000 mg    bisacodyL suppository 10 mg    fluticasone furoate-vilanteroL 100-25 mcg/dose diskus inhaler 1 puff    melatonin tablet 6 mg    methocarbamoL tablet 500 mg    morphine injection 2 mg    ondansetron injection 4 mg    oxyCODONE immediate release tablet 10 mg    oxyCODONE immediate release tablet 5 mg    pregabalin capsule 75 mg    sodium chloride 0.9% flush 10 mL    sodium chloride 0.9% flush 10 mL     Objective:     Vital Signs (Most Recent):  Temp: 98.6 °F (37 °C) (02/03/23 0122)  Pulse: 62 (02/03/23 0122)  Resp: 16 (02/03/23 0122)  BP: (!) 95/53 (02/03/23 0122)  SpO2: (!) 94 % (02/03/23 0122)   Vital Signs (24h Range):  Temp:  [98 °F (36.7 °C)-98.6 °F (37 °C)] 98.6 °F (37 °C)  Pulse:  [60-84] 62  Resp:  [16-30] 16  SpO2:  [93 %-95 %] 94 %  BP: ()/(50-57) 95/53     Weight: 82.6 kg (182 lb)  Height: 6' (182.9 cm)  Body mass index is 24.68 kg/m².    No intake or output data in the 24 hours ending 02/03/23 0645    Ortho/SPM Exam  RLE  - TTP right hip  - RLE shortened and externally rotated  - Compartments soft and compressible  - ROM full (eversion,inversion,plantar/dorsiflexion)  - TA/EHL/Gastroc/FHL assessed in isolation without deficit  - SILT throughout  - DP and PT palpated  2+  - Capillary Refill <3s       Significant Labs: CBC:   Recent Labs   Lab 02/02/23  1740 02/03/23  0344   WBC 11.54 8.98   HGB 14.7 13.6*   HCT 46.3 43.0    175     CMP:   Recent Labs   Lab 02/02/23  1740 02/03/23  0344    137   K 4.3 4.1    107   CO2 28 22*   * 115*   BUN 24* 18   CREATININE 0.9 0.7   CALCIUM 8.8  8.2*   PROT 6.5 5.6*   ALBUMIN 3.3* 2.9*   BILITOT 1.3* 1.2*   ALKPHOS 73 65   AST 18 15   ALT 18 14   ANIONGAP 5* 8     All pertinent labs within the past 24 hours have been reviewed.    Significant Imaging: I have reviewed and interpreted all pertinent imaging results/findings.

## 2023-02-03 NOTE — HOSPITAL COURSE
2/3 - BP 99/61 VSS. Saw pt in post op setting at 5 pm. Doing well. Wife at bedside they need a . BP 97/55 VSS,  SpO2 (!) 92% on  2 liters nc. (Home dose)  Ate dinner. Looks comfortable. PNC in place.     2/4- BP 90/51 VSS. s/p R DHEERAJ on 2/3/23. Pt eating. Icelandic speaking, Needs .  PT/OT: WBAT RLE, anterior hip precautions.  DVT PPx: Eliquis BID, FCDs at all times when not ambulating.  Abx: postop Ancef. Oshea: remove POD1  Dispo: pending PT and pain control . Lab stable    2/5- completed ancef, walking w PT down halls. /55 VSS. Speaking in Icelandic through . And expressed understanding.     2/6- Pt desires to go home. PT recommending HH and rolling walker. BP 99/54 walking down halls  will dc to home.

## 2023-02-03 NOTE — SUBJECTIVE & OBJECTIVE
Interval History: see above    Review of Systems   Constitutional:  Positive for activity change.   HENT:  Negative for trouble swallowing.    Respiratory:  Negative for cough, choking and shortness of breath.    Cardiovascular:  Negative for chest pain and leg swelling.   Gastrointestinal:  Negative for blood in stool, constipation, diarrhea and vomiting.   Genitourinary:  Negative for difficulty urinating and dysuria.   Musculoskeletal:  Positive for arthralgias (right hip) and gait problem. Negative for back pain and neck stiffness.   Neurological:  Negative for numbness.   Psychiatric/Behavioral:  Negative for behavioral problems and confusion.    Objective:     Vital Signs (Most Recent):  Temp: 98.8 °F (37.1 °C) (02/03/23 0547)  Pulse: 63 (02/03/23 0547)  Resp: 17 (02/03/23 0547)  BP: 99/61 (02/03/23 0547)  SpO2: 95 % (02/03/23 0547) Vital Signs (24h Range):  Temp:  [98 °F (36.7 °C)-98.8 °F (37.1 °C)] 98.8 °F (37.1 °C)  Pulse:  [60-84] 63  Resp:  [16-30] 17  SpO2:  [93 %-95 %] 95 %  BP: ()/(50-61) 99/61     Weight: 82.6 kg (182 lb)  Body mass index is 24.68 kg/m².    Intake/Output Summary (Last 24 hours) at 2/3/2023 0711  Last data filed at 2/3/2023 0437  Gross per 24 hour   Intake --   Output 400 ml   Net -400 ml      Physical Exam  Constitutional:       General: He is not in acute distress.     Appearance: Normal appearance. He is not ill-appearing, toxic-appearing or diaphoretic.   HENT:      Head: Normocephalic and atraumatic.      Nose: Nose normal.      Mouth/Throat:      Mouth: Mucous membranes are moist.      Pharynx: Oropharynx is clear.   Eyes:      General: No scleral icterus.     Extraocular Movements: Extraocular movements intact.      Conjunctiva/sclera: Conjunctivae normal.      Pupils: Pupils are equal, round, and reactive to light.   Cardiovascular:      Rate and Rhythm: Normal rate and regular rhythm.      Pulses: Normal pulses.      Heart sounds: Normal heart sounds. No murmur  heard.  Pulmonary:      Effort: Pulmonary effort is normal. No respiratory distress.      Breath sounds: Normal breath sounds. No stridor. No wheezing, rhonchi or rales.   Chest:      Chest wall: No tenderness.   Abdominal:      General: Abdomen is flat. Bowel sounds are normal. There is no distension.      Palpations: Abdomen is soft. There is no mass.      Tenderness: There is no abdominal tenderness. There is no right CVA tenderness, guarding or rebound.   Musculoskeletal:         General: Signs of injury (right hip) present. No swelling, tenderness or deformity. Normal range of motion.      Cervical back: Normal range of motion and neck supple. No rigidity or tenderness.      Right lower leg: No edema.      Left lower leg: No edema.      Comments: Right hip scar dressing is clean and intact.    Skin:     General: Skin is warm and dry.      Coloration: Skin is not jaundiced.      Findings: No erythema or rash.   Neurological:      General: No focal deficit present.      Mental Status: He is alert and oriented to person, place, and time. Mental status is at baseline.      Motor: No weakness.   Psychiatric:         Mood and Affect: Mood normal.         Behavior: Behavior normal.         Thought Content: Thought content normal.         Judgment: Judgment normal.       Significant Labs: All pertinent labs within the past 24 hours have been reviewed.  CBC:   Recent Labs   Lab 02/02/23  1740 02/03/23  0344   WBC 11.54 8.98   HGB 14.7 13.6*   HCT 46.3 43.0    175     CMP:   Recent Labs   Lab 02/02/23  1740 02/03/23  0344    137   K 4.3 4.1    107   CO2 28 22*   * 115*   BUN 24* 18   CREATININE 0.9 0.7   CALCIUM 8.8 8.2*   PROT 6.5 5.6*   ALBUMIN 3.3* 2.9*   BILITOT 1.3* 1.2*   ALKPHOS 73 65   AST 18 15   ALT 18 14   ANIONGAP 5* 8       Significant Imaging: I have reviewed all pertinent imaging results/findings within the past 24 hours.

## 2023-02-03 NOTE — H&P
[facility] - Emergency Dept  [facility] Medicine  History & Physical    Patient Name: Agustin Hernandes  MRN: [id number]  Patient Class: IP- Inpatient  Admission Date: 2/2/2023  Attending Physician: Alexa Ibarra MD   Primary Care Provider: Susy Oneil MD         Patient information was obtained from patient, past medical records and ER records.     Subjective:     Principal Problem:Closed displaced fracture of right femoral neck    Chief Complaint:   Chief Complaint   Patient presents with    Fall     Fall yesterday, denies LOC, x-ray today right hip fracture - hx of COPD on O2 at times         HPI: 81 yo M with PMHx of COPD on 2L home oxygen who presents for evaluation of R hip fracture noted on outpatient imaging. Pt. Son at bedside assists with history. Pt. Currently visiting his son, lives in [facility]. Yesterday evening, the patient tripped trying to get by a dog gate in his son's house and fell backwards onto his buttocks. He noted immeidate pain and difficulty bearing weight, but he was able to walk with asssitance, and his pain improved with ice and ibuprofen. The patient's pain continued to worsen today, however, particularly on his R hip, so his son took him for further evaluation. Outpatient X-rays were positive for    Acute mildly displaced right femoral neck fracture and patient was intructed to come to ED. At baseline, pt. Is independent and ambulatory. No reported SOB, chest pain, lightheadedness, or LOC that contributed to patient's fall yesterday.        Review of patient's allergies indicates:  No Known Allergies    No current facility-administered medications on file prior to encounter.     No current outpatient medications on file prior to encounter.     Family History    No relevant family history reported       Tobacco Use    Smoking status: Not on file    Smokeless tobacco: Not on file   Substance and Sexual Activity    Alcohol use: Not on file    Drug use: Not on file    Sexual  activity: Not on file     Review of Systems   Constitutional:  Negative for activity change, chills, fever and unexpected weight change.   HENT:  Negative for congestion and sore throat.    Respiratory:  Negative for cough, shortness of breath and wheezing.    Cardiovascular:  Negative for chest pain, palpitations and leg swelling.   Gastrointestinal:  Negative for abdominal pain, blood in stool, nausea and vomiting.   Genitourinary:  Negative for dysuria and hematuria.   Musculoskeletal:  Positive for arthralgias and gait problem. Negative for neck pain.   Skin:  Negative for color change and rash.   Neurological:  Negative for dizziness, seizures and numbness.   Psychiatric/Behavioral:  Negative for hallucinations and suicidal ideas.    Objective:     Vital Signs (Most Recent):  Temp: 98.4 °F (36.9 °C) (02/02/23 1641)  Pulse: 63 (02/02/23 1747)  Resp: (!) 30 (02/02/23 1804)  BP: (!) 116/57 (02/02/23 1747)  SpO2: 95 % (02/02/23 1747)   Vital Signs (24h Range):  Temp:  [98.4 °F (36.9 °C)] 98.4 °F (36.9 °C)  Pulse:  [63-84] 63  Resp:  [20-30] 30  SpO2:  [93 %-95 %] 95 %  BP: ()/(51-57) 116/57     Weight: 82.6 kg (182 lb)  Body mass index is 24.68 kg/m².    Physical Exam  Vitals reviewed.   Constitutional:       General: He is not in acute distress.     Appearance: He is well-developed.   HENT:      Head: Normocephalic and atraumatic.   Eyes:      Extraocular Movements: Extraocular movements intact.      Pupils: Pupils are equal, round, and reactive to light.   Neck:      Vascular: No JVD.      Trachea: No tracheal deviation.   Cardiovascular:      Rate and Rhythm: Normal rate and regular rhythm.      Heart sounds: No murmur heard.    No friction rub. No gallop.   Pulmonary:      Effort: No respiratory distress.      Breath sounds: Normal breath sounds. No wheezing or rales.   Abdominal:      General: Bowel sounds are normal. There is no distension.      Palpations: Abdomen is soft. There is no mass.       Tenderness: There is no abdominal tenderness.   Musculoskeletal:         General: Tenderness and deformity present.      Cervical back: Neck supple.   Lymphadenopathy:      Cervical: No cervical adenopathy.   Skin:     General: Skin is warm and dry.      Findings: No rash.   Neurological:      Mental Status: He is alert and oriented to person, place, and time.      Gait: Gait abnormal.         CRANIAL NERVES     CN III, IV, VI   Pupils are equal, round, and reactive to light.     Significant Labs: All pertinent labs within the past 24 hours have been reviewed.    Significant Imaging: I have reviewed all pertinent imaging results/findings within the past 24 hours.    Assessment/Plan:     * Closed displaced fracture of right femoral neck  -Orthopedic surgery consulted and plan to take patient to the OR tomorrow. Pt. NPO at midnight for surgical repair of hip fracture  -Pain control as per Hip Fracture Pathway with multimodal pain regimen with scheduled Tylenol and Lyrica 75 mg po nightly. Robaxin, oxycodone PRN. IV morphine PRN for pain not controlled by PO medications  -DVT prophylaxis pre-op with TEDs/SCDs.   -Check Vitamin D level to assess for Vitamin D deficiency due to concern for osteoporotic fracture.   -Plan to monitor daily electrolytes and H/H post-op.   -Start Lovenox 40 mg subcutaneous daily post-op after surgery for DVT prophylaxis and will need for a total of 28 days after hip fracture surgery  -Consult PT/OT post-op for gait training and strengthening and restoration of ADLs.   -Consult  and case management to assist with discharge planning for this patient after surgery.      Preoperative Cardiac evaluation  Cardiovascular Risk Assessment:  Non-emergent surgery.  No active cardiac problems (such as unstable angina, decompensated heart failure, significant uncontrolled arrhythmias or severe valvular disease).  Intermediate risk surgery.  Functional Status: He IS NOT able to climb a flight  of stairs (> 4 METS) with no CP or SOB  His revised cardiac risk index is 0.     1 pt Each: Ischemic Heart Disease, Cerebrovascular Disease,                     CHF, DM, Creatinine > 2           Other Issues: Chronic hypoxic respiratory failure, no acute isses. Ok to proceed with surgery without further cardiac testing or medical optimization        Chronic hypoxemic respiratory failure  -Pt. With chronic COPD on 2L NC. Currently stable, CXR without any acute process  -Duo-nebs PRN, continue home ICS/LAMA monitor with intermitent pulse oximetry      VTE Risk Mitigation (From admission, onward)         Ordered     IP VTE HIGH RISK PATIENT  Once         02/02/23 1937     Place sequential compression device  Until discontinued         02/02/23 1937                   Josh Lozano MD  Department of Hospital Medicine   [facility] - Emergency Dept

## 2023-02-04 LAB
ALBUMIN SERPL BCP-MCNC: 2.5 G/DL (ref 3.5–5.2)
ALP SERPL-CCNC: 57 U/L (ref 55–135)
ALT SERPL W/O P-5'-P-CCNC: 20 U/L (ref 10–44)
ANION GAP SERPL CALC-SCNC: 7 MMOL/L (ref 8–16)
AST SERPL-CCNC: 30 U/L (ref 10–40)
BASOPHILS # BLD AUTO: 0.02 K/UL (ref 0–0.2)
BASOPHILS NFR BLD: 0.2 % (ref 0–1.9)
BILIRUB SERPL-MCNC: 1.2 MG/DL (ref 0.1–1)
BUN SERPL-MCNC: 15 MG/DL (ref 8–23)
CALCIUM SERPL-MCNC: 7.4 MG/DL (ref 8.7–10.5)
CHLORIDE SERPL-SCNC: 106 MMOL/L (ref 95–110)
CO2 SERPL-SCNC: 24 MMOL/L (ref 23–29)
CREAT SERPL-MCNC: 0.8 MG/DL (ref 0.5–1.4)
DIFFERENTIAL METHOD: ABNORMAL
EOSINOPHIL # BLD AUTO: 0.1 K/UL (ref 0–0.5)
EOSINOPHIL NFR BLD: 1.3 % (ref 0–8)
ERYTHROCYTE [DISTWIDTH] IN BLOOD BY AUTOMATED COUNT: 14 % (ref 11.5–14.5)
EST. GFR  (NO RACE VARIABLE): >60 ML/MIN/1.73 M^2
GLUCOSE SERPL-MCNC: 130 MG/DL (ref 70–110)
HCT VFR BLD AUTO: 29 % (ref 40–54)
HGB BLD-MCNC: 9.1 G/DL (ref 14–18)
IMM GRANULOCYTES # BLD AUTO: 0.04 K/UL (ref 0–0.04)
IMM GRANULOCYTES NFR BLD AUTO: 0.4 % (ref 0–0.5)
LYMPHOCYTES # BLD AUTO: 0.7 K/UL (ref 1–4.8)
LYMPHOCYTES NFR BLD: 6 % (ref 18–48)
MCH RBC QN AUTO: 29.3 PG (ref 27–31)
MCHC RBC AUTO-ENTMCNC: 31.4 G/DL (ref 32–36)
MCV RBC AUTO: 93 FL (ref 82–98)
MONOCYTES # BLD AUTO: 1.1 K/UL (ref 0.3–1)
MONOCYTES NFR BLD: 10 % (ref 4–15)
NEUTROPHILS # BLD AUTO: 9.2 K/UL (ref 1.8–7.7)
NEUTROPHILS NFR BLD: 82.1 % (ref 38–73)
NRBC BLD-RTO: 0 /100 WBC
PLATELET # BLD AUTO: 157 K/UL (ref 150–450)
PMV BLD AUTO: 11.5 FL (ref 9.2–12.9)
POTASSIUM SERPL-SCNC: 4 MMOL/L (ref 3.5–5.1)
PROT SERPL-MCNC: 4.7 G/DL (ref 6–8.4)
RBC # BLD AUTO: 3.11 M/UL (ref 4.6–6.2)
SODIUM SERPL-SCNC: 137 MMOL/L (ref 136–145)
WBC # BLD AUTO: 11.17 K/UL (ref 3.9–12.7)

## 2023-02-04 PROCEDURE — 97161 PT EVAL LOW COMPLEX 20 MIN: CPT

## 2023-02-04 PROCEDURE — 97530 THERAPEUTIC ACTIVITIES: CPT

## 2023-02-04 PROCEDURE — 25000003 PHARM REV CODE 250: Performed by: HOSPITALIST

## 2023-02-04 PROCEDURE — 36415 COLL VENOUS BLD VENIPUNCTURE: CPT | Performed by: STUDENT IN AN ORGANIZED HEALTH CARE EDUCATION/TRAINING PROGRAM

## 2023-02-04 PROCEDURE — 85025 COMPLETE CBC W/AUTO DIFF WBC: CPT | Performed by: STUDENT IN AN ORGANIZED HEALTH CARE EDUCATION/TRAINING PROGRAM

## 2023-02-04 PROCEDURE — 97112 NEUROMUSCULAR REEDUCATION: CPT

## 2023-02-04 PROCEDURE — 97116 GAIT TRAINING THERAPY: CPT

## 2023-02-04 PROCEDURE — 97165 OT EVAL LOW COMPLEX 30 MIN: CPT

## 2023-02-04 PROCEDURE — 11000001 HC ACUTE MED/SURG PRIVATE ROOM

## 2023-02-04 PROCEDURE — 99233 PR SUBSEQUENT HOSPITAL CARE,LEVL III: ICD-10-PCS | Mod: ,,, | Performed by: HOSPITALIST

## 2023-02-04 PROCEDURE — 94640 AIRWAY INHALATION TREATMENT: CPT

## 2023-02-04 PROCEDURE — 99900035 HC TECH TIME PER 15 MIN (STAT)

## 2023-02-04 PROCEDURE — 80053 COMPREHEN METABOLIC PANEL: CPT | Performed by: STUDENT IN AN ORGANIZED HEALTH CARE EDUCATION/TRAINING PROGRAM

## 2023-02-04 PROCEDURE — 27000221 HC OXYGEN, UP TO 24 HOURS

## 2023-02-04 PROCEDURE — 25000003 PHARM REV CODE 250: Performed by: STUDENT IN AN ORGANIZED HEALTH CARE EDUCATION/TRAINING PROGRAM

## 2023-02-04 PROCEDURE — 25000242 PHARM REV CODE 250 ALT 637 W/ HCPCS: Performed by: HOSPITALIST

## 2023-02-04 PROCEDURE — 99233 SBSQ HOSP IP/OBS HIGH 50: CPT | Mod: ,,, | Performed by: HOSPITALIST

## 2023-02-04 PROCEDURE — 94761 N-INVAS EAR/PLS OXIMETRY MLT: CPT

## 2023-02-04 PROCEDURE — 63600175 PHARM REV CODE 636 W HCPCS: Performed by: HOSPITALIST

## 2023-02-04 PROCEDURE — 97535 SELF CARE MNGMENT TRAINING: CPT

## 2023-02-04 RX ADMIN — METHOCARBAMOL 500 MG: 500 TABLET ORAL at 01:02

## 2023-02-04 RX ADMIN — SENNOSIDES AND DOCUSATE SODIUM 1 TABLET: 50; 8.6 TABLET ORAL at 09:02

## 2023-02-04 RX ADMIN — METHOCARBAMOL 500 MG: 500 TABLET ORAL at 05:02

## 2023-02-04 RX ADMIN — APIXABAN 2.5 MG: 2.5 TABLET, FILM COATED ORAL at 09:02

## 2023-02-04 RX ADMIN — METHOCARBAMOL 500 MG: 500 TABLET ORAL at 09:02

## 2023-02-04 RX ADMIN — CELECOXIB 100 MG: 100 CAPSULE ORAL at 09:02

## 2023-02-04 RX ADMIN — ACETAMINOPHEN 1000 MG: 325 TABLET ORAL at 11:02

## 2023-02-04 RX ADMIN — ACETAMINOPHEN 1000 MG: 325 TABLET ORAL at 07:02

## 2023-02-04 RX ADMIN — POLYETHYLENE GLYCOL 3350 17 G: 17 POWDER, FOR SOLUTION ORAL at 09:02

## 2023-02-04 RX ADMIN — CEFAZOLIN 2 G: 2 INJECTION, POWDER, FOR SOLUTION INTRAMUSCULAR; INTRAVENOUS at 12:02

## 2023-02-04 RX ADMIN — ACETAMINOPHEN 1000 MG: 325 TABLET ORAL at 12:02

## 2023-02-04 RX ADMIN — FLUTICASONE FUROATE AND VILANTEROL TRIFENATATE 1 PUFF: 100; 25 POWDER RESPIRATORY (INHALATION) at 07:02

## 2023-02-04 NOTE — PLAN OF CARE
"Agustin Hernandes is a 82 y.o. [place] [facility] [place] Closed displaced fracture of right femoral neck after fall at home (visiting from [place] to visit son), underwent R anterior DHEERAJ with Dr. Nunez on 2/3. Agustin Hernandes tolerated evaluation well today. He was very pleasant and agreeable to session, family member at bedside providing interpretation (patient is primarily [place]-speaking). Educated pt and family on orthopedic precautions, ok to weight-bear as tolerated to RLE but to avoid extremes of motion at R hip. Able to stand from bed with CGA using RW, no initial pain with RLE weightbearing. Ambulates ~130 ft in hallways on room air with rolling walker; initially requiring min (A) to control pace of walker as well as cueing for 3-pt gait sequencing (to offload RLE as needed) but progressed to SBA with time. Became more fatigued by end of gait trial with 1/10 R hip pain; sitting up in chair, RN into room to inform that telemetry with bigeminy and trigeminy. Pt reporting "I felt that my heart was racing but I feel fine." Up in chair with o2 intact, pt comfortable, rolling walker at bedside (ok to be up with nursing using the RW). Discussed PT role, POC, goals and recommendations (Home with family and HH; rolling walker) with patient; verbalized understanding. Agustin Hernandes would benefit from acute PT services to promote mobility during this admission and improve return to PLOF.    Problem: Physical Therapy  Goal: Physical Therapy Goal  Description: Goals to be met by: 23     Patient will increase functional independence with mobility by performin. Supine to sit with Modified [place] - Not met  2. Sit to stand transfer with Supervision with RW - Not met  3. Gait  x 200 feet with Stand-by Assistance using Rolling Walker - Not met  4. Ascend/Descend 6 inch curb step with Contact Guard Assistance using Rolling Walker - Not met  5. Pt will verbalize RLE orthopedic " precautions without cueing (RLE WBAT, no extremes of motion) - Notm et  Outcome: Ongoing, Progressing    Floyd Khanna, PT  2/4/2023

## 2023-02-04 NOTE — PROGRESS NOTES
[facility] Medicine  Progress Note    Patient Name: Agustin Hernandes  MRN: [id number]  Patient Class: IP- Inpatient   Admission Date: 2/2/2023  Length of Stay: 2 days  Attending Physician: Alexa Ibarra MD  Primary Care Provider: Susy Oneil MD        Subjective:     Principal Problem:Closed displaced fracture of right femoral neck        HPI:  81 yo M with PMHx of COPD on 2L home oxygen who presents for evaluation of R hip fracture noted on outpatient imaging. Pt. Son at bedside assists with history. Pt. Currently visiting his son, lives in [facility]. Yesterday evening, the patient tripped trying to get by a dog gate in his son's house and fell backwards onto his buttocks. He noted immeidate pain and difficulty bearing weight, but he was able to walk with asssitance, and his pain improved with ice and ibuprofen. The patient's pain continued to worsen today, however, particularly on his R hip, so his son took him for further evaluation. Outpatient X-rays were positive for    Acute mildly displaced right femoral neck fracture and patient was intructed to come to ED. At baseline, pt. Is independent and ambulatory. No reported SOB, chest pain, lightheadedness, or LOC that contributed to patient's fall yesterday.    In the ED, ortho was consulted. They are planning on surgery.  NPO since midnight  - Pt marked, booked, and consented for surgery  -DVT PPx: Hold anticoagulation  -Abx: Preop abx ordered  -Hgb 13.6, 1u pRBC on hold  - owens in place, UA with leukocytes, given 1g of rocephin  - Iv: ordered for contralateral arm      Overview/Hospital Course:  2/3 - BP 99/61 VSS. Saw pt in post op setting at 5 pm. Doing well. Wife at bedside they need a . BP 97/55 VSS,  SpO2 (!) 92% on  2 liters nc. (Home dose)  Ate dinner. Looks comfortable. PNC in place.     2/4- BP 90/51 VSS. s/p R DHEERAJ on 2/3/23. Pt eating. Romanian speaking, Needs .  PT/OT: WBAT RLE, anterior hip precautions.   DVT PPx: Eliquis BID, FCDs at all times when not ambulating.  Abx: postop Ancef. Oshea: remove POD1  Dispo: pending PT and pain control     Lab pending      Interval History: see above    Review of Systems   Constitutional:  Positive for activity change.   HENT:  Negative for trouble swallowing.    Respiratory:  Negative for cough and shortness of breath.    Cardiovascular:  Negative for chest pain and leg swelling.   Gastrointestinal:  Positive for abdominal pain. Negative for constipation, nausea and vomiting.   Genitourinary:  Negative for difficulty urinating.   Musculoskeletal:  Positive for arthralgias (right hip). Negative for neck stiffness.   Psychiatric/Behavioral:  Negative for agitation, behavioral problems and confusion.    Objective:     Vital Signs (Most Recent):  Temp: 98.2 °F (36.8 °C) (02/04/23 0637)  Pulse: 71 (02/04/23 0732)  Resp: 14 (02/04/23 0732)  BP: (!) 90/51 (02/04/23 0637)  SpO2: 97 % (02/04/23 0733)   Vital Signs (24h Range):  Temp:  [97.5 °F (36.4 °C)-99.4 °F (37.4 °C)] 98.2 °F (36.8 °C)  Pulse:  [65-82] 71  Resp:  [14-20] 14  SpO2:  [92 %-97 %] 97 %  BP: ()/(45-58) 90/51     Weight: 62 kg (136 lb 11 oz)  Body mass index is 18.92 kg/m².    Intake/Output Summary (Last 24 hours) at 2/4/2023 0809  Last data filed at 2/3/2023 1301  Gross per 24 hour   Intake 4000 ml   Output 1095 ml   Net 2905 ml      Physical Exam  Constitutional:       General: He is not in acute distress.     Appearance: Normal appearance. He is obese. He is not ill-appearing, toxic-appearing or diaphoretic.   HENT:      Head: Normocephalic and atraumatic.      Nose: Nose normal.      Mouth/Throat:      Mouth: Mucous membranes are moist.      Pharynx: Oropharynx is clear.   Eyes:      General: No scleral icterus.     Extraocular Movements: Extraocular movements intact.      Conjunctiva/sclera: Conjunctivae normal.      Pupils: Pupils are equal, round, and reactive to light.   Cardiovascular:      Rate and Rhythm:  Normal rate and regular rhythm.      Pulses: Normal pulses.      Heart sounds: Normal heart sounds. No murmur heard.  Pulmonary:      Effort: Pulmonary effort is normal. No respiratory distress.      Breath sounds: Normal breath sounds. No stridor. No wheezing, rhonchi or rales.   Chest:      Chest wall: No tenderness.   Abdominal:      General: Abdomen is flat. Bowel sounds are normal. There is no distension.      Palpations: Abdomen is soft.      Tenderness: There is no abdominal tenderness. There is no right CVA tenderness, guarding or rebound.   Musculoskeletal:         General: No swelling, tenderness, deformity or signs of injury. Normal range of motion.      Cervical back: Normal range of motion and neck supple. No rigidity or tenderness.      Right lower leg: No edema.      Left lower leg: No edema.      Comments: Right hip dressing clean and intact   Skin:     General: Skin is warm and dry.      Coloration: Skin is not jaundiced.      Findings: No erythema or rash.   Neurological:      General: No focal deficit present.      Mental Status: He is alert and oriented to person, place, and time. Mental status is at baseline.      Motor: No weakness.   Psychiatric:         Mood and Affect: Mood normal.         Behavior: Behavior normal.         Thought Content: Thought content normal.         Judgment: Judgment normal.       Significant Labs: All pertinent labs within the past 24 hours have been reviewed.  CBC:   Recent Labs   Lab 02/02/23  1740 02/03/23  0344 02/03/23  1321   WBC 11.54 8.98 11.98   HGB 14.7 13.6* 10.4*   HCT 46.3 43.0 33.6*    175 139*     CMP:   Recent Labs   Lab 02/02/23  1740 02/03/23  0344    137   K 4.3 4.1    107   CO2 28 22*   * 115*   BUN 24* 18   CREATININE 0.9 0.7   CALCIUM 8.8 8.2*   PROT 6.5 5.6*   ALBUMIN 3.3* 2.9*   BILITOT 1.3* 1.2*   ALKPHOS 73 65   AST 18 15   ALT 18 14   ANIONGAP 5* 8       Significant Imaging: I have reviewed all pertinent imaging  results/findings within the past 24 hours.      Assessment/Plan:      * Closed displaced fracture of right femoral neck  -Orthopedic surgery consulted and plan to take patient to the OR tomorrow. Pt. NPO at midnight for surgical repair of hip fracture  -Pain control as per Hip Fracture Pathway with multimodal pain regimen with scheduled Tylenol and Lyrica 75 mg po nightly. Robaxin, oxycodone PRN. IV morphine PRN for pain not controlled by PO medications  -DVT prophylaxis pre-op with TEDs/SCDs.   -Check Vitamin D level to assess for Vitamin D deficiency due to concern for osteoporotic fracture.   -Plan to monitor daily electrolytes and H/H post-op.   -Start Lovenox 40 mg subcutaneous daily post-op after surgery for DVT prophylaxis and will need for a total of 28 days after hip fracture surgery  -Consult PT/OT post-op for gait training and strengthening and restoration of ADLs.   -Consult  and case management to assist with discharge planning for this patient after surgery.      Preoperative Cardiac evaluation  Cardiovascular Risk Assessment:  Non-emergent surgery.  No active cardiac problems (such as unstable angina, decompensated heart failure, significant uncontrolled arrhythmias or severe valvular disease).  Intermediate risk surgery.  Functional Status: He IS NOT able to climb a flight of stairs (> 4 METS) with no CP or SOB  His revised cardiac risk index is 0.     1 pt Each: Ischemic Heart Disease, Cerebrovascular Disease,                     CHF, DM, Creatinine > 2           Other Issues: Chronic hypoxic respiratory failure, no acute isses. Ok to proceed with surgery without further cardiac testing or medical optimization    2/3 - to OR today  2/4- stable        Chronic hypoxemic respiratory failure  -Pt. With chronic COPD on 2L NC. Currently stable, CXR without any acute process  -Duo-nebs PRN, continue home ICS/LAMA monitor with intermitent pulse oximetry  2/4- oxygen 2 liters     Age-related  osteoporosis with current pathological fracture  See above  F/u [facility]        VTE Risk Mitigation (From admission, onward)         Ordered     apixaban tablet 2.5 mg  2 times daily         02/03/23 1245     Place sequential compression device  Until discontinued         02/03/23 0748     Place sequential compression device  Until discontinued         02/02/23 1937                Discharge Planning   GABRIELA: 2/6/2023     Code Status: Full Code   Is the patient medically ready for discharge?: No    Reason for patient still in hospital (select all that apply): Patient trending condition  Discharge Plan A: Home with family            Alexa Ibarra MD  Senior Hospitalist  Department of Hospital Medicine  [facility]  [id number], [phone]    [facility] - Surgery

## 2023-02-04 NOTE — PROGRESS NOTES
"[facility] - Surgery  Orthopedics  Progress Note    Patient Name: Agustin Hernandes  MRN: [id number]  Admission Date: 2/2/2023  Hospital Length of Stay: 2 days  Attending Provider: Alexa Ibarra MD  Primary Care Provider: Susy Oneil MD  Follow-up For: Procedure(s) (LRB):  ARTHROPLASTY, HIP, TOTAL, ANTERIOR APPROACH (Right)    Post-Operative Day: 1 Day Post-Op  Subjective:     Principal Problem:Closed displaced fracture of right femoral neck    Principal Orthopedic Problem: Same    Interval History: NAEON, patient stable, pain controlled. No acute complaints this morning. AM labs pending. Not seen by PT yet. Dressings CDI.     Review of patient's allergies indicates:  No Known Allergies    Current Facility-Administered Medications   Medication    acetaminophen tablet 1,000 mg    apixaban tablet 2.5 mg    bisacodyL suppository 10 mg    celecoxib capsule 100 mg    fluticasone furoate-vilanteroL 100-25 mcg/dose diskus inhaler 1 puff    melatonin tablet 6 mg    methocarbamoL tablet 500 mg    methocarbamoL tablet 500 mg    ondansetron injection 4 mg    polyethylene glycol packet 17 g    senna-docusate 8.6-50 mg per tablet 1 tablet    sodium chloride 0.9% flush 10 mL    sodium chloride 0.9% flush 10 mL     Objective:     Vital Signs (Most Recent):  Temp: 98.2 °F (36.8 °C) (02/04/23 0637)  Pulse: 77 (02/04/23 0637)  Resp: 17 (02/04/23 0637)  BP: (!) 90/51 (02/04/23 0637)  SpO2: (!) 92 % (02/04/23 0637)   Vital Signs (24h Range):  Temp:  [97.5 °F (36.4 °C)-99.4 °F (37.4 °C)] 98.2 °F (36.8 °C)  Pulse:  [65-82] 77  Resp:  [14-20] 17  SpO2:  [92 %-96 %] 92 %  BP: ()/(45-58) 90/51     Weight: 62 kg (136 lb 11 oz)  Height: 5' 11.26" (181 cm)  Body mass index is 18.92 kg/m².      Intake/Output Summary (Last 24 hours) at 2/4/2023 0707  Last data filed at 2/3/2023 1301  Gross per 24 hour   Intake 4000 ml   Output 1095 ml   Net 2905 ml       Ortho/SPM Exam  AAOx4  NAD  Reg rate  No increased " WOB    RLE:  Dressing c/d/i  SILT T/SP/DP/Montoya/Sa  Motor intact T/SP/DP  WWP extremities  FCDs in place and functioning    Significant Labs:   Recent Lab Results         02/03/23  1321        Baso # 0.03       Basophil % 0.3       Differential Method Automated       Eos # 0.2       Eosinophil % 1.4       Gran # (ANC) 10.4       Gran % 86.3       Hematocrit 33.6       Hemoglobin 10.4       Immature Grans (Abs) 0.06  Comment: Mild elevation in immature granulocytes is non specific and   can be seen in a variety of conditions including stress response,   acute inflammation, trauma and pregnancy. Correlation with other   laboratory and clinical findings is essential.         Immature Granulocytes 0.5       Lymph # 0.4       Lymph % 3.3       MCH 28.9       MCHC 31.0       MCV 93       Mono # 1.0       Mono % 8.2       MPV 10.8       nRBC 0       Platelets 139       RBC 3.60       RDW 13.8       WBC 11.98             All pertinent labs within the past 24 hours have been reviewed.    Significant Imaging: I have reviewed and interpreted all pertinent imaging results/findings.    Assessment/Plan:     * Closed displaced fracture of right femoral neck  Agustin Hernandes is a 82 y.o. male with right varus impacted femoral neck fracture, closed, NVI. They are not anticoagulation at home. They do not require ambulatory assistive devices prior to this injury.  Plan for OR today for R anterior DHEERAJ vs hemiarthroplasty    s/p R DHEERAJ on 2/3/23  Surgical dressing C/D/I  Pain control: multimodal  PT/OT: WBAT RLE, anterior hip precautions  DVT PPx: Eliquis BID, FCDs at all times when not ambulating  Abx: postop Ancef  Drain: none  Oshea: remove POD1    Dispo: pending PT and pain control             Kobe Sloan MD  Orthopedics  [facility] - Surgery

## 2023-02-04 NOTE — ASSESSMENT & PLAN NOTE
-Orthopedic surgery consulted and plan to take patient to the OR tomorrow. Pt. NPO at midnight for surgical repair of hip fracture  -Pain control as per Hip Fracture Pathway with multimodal pain regimen with scheduled Tylenol and Lyrica 75 mg po nightly. Robaxin, oxycodone PRN. IV morphine PRN for pain not controlled by PO medications  -DVT prophylaxis pre-op with TEDs/SCDs.   -Check Vitamin D level to assess for Vitamin D deficiency due to concern for osteoporotic fracture.   -Plan to monitor daily electrolytes and H/H post-op.   -Start Lovenox 40 mg subcutaneous daily post-op after surgery for DVT prophylaxis and will need for a total of 28 days after hip fracture surgery  -Consult PT/OT post-op for gait training and strengthening and restoration of ADLs.   -Consult  and case management to assist with discharge planning for this patient after surgery.      Preoperative Cardiac evaluation  Cardiovascular Risk Assessment:  Non-emergent surgery.  No active cardiac problems (such as unstable angina, decompensated heart failure, significant uncontrolled arrhythmias or severe valvular disease).  Intermediate risk surgery.  Functional Status: He IS NOT able to climb a flight of stairs (> 4 METS) with no CP or SOB  His revised cardiac risk index is 0.     1 pt Each: Ischemic Heart Disease, Cerebrovascular Disease,                     CHF, DM, Creatinine > 2           Other Issues: Chronic hypoxic respiratory failure, no acute isses. Ok to proceed with surgery without further cardiac testing or medical optimization    2/3 - to OR today  2/4- stable

## 2023-02-04 NOTE — ASSESSMENT & PLAN NOTE
-Pt. With chronic COPD on 2L NC. Currently stable, CXR without any acute process  -Duo-nebs PRN, continue home ICS/LAMA monitor with intermitent pulse oximetry  2/4- oxygen 2 liters

## 2023-02-04 NOTE — NURSING
Paged Med Team H, patient at end of PT became fatigued, on Tele HR 88-91 with PVCs, bigeminy and trigeminy noted, denies chest pain for shortness of breath.  BP low this am 90/60 was told by night nurse and patient's son this is patient's baseline. JEET Torres notified of above. Patient up in chair, call light in reach, sitter at bedside.     1244 Dr Ibarra on unit, notified of above, discussed patient COPD hx, continue to monitor.  VVO discontinue owens

## 2023-02-04 NOTE — SUBJECTIVE & OBJECTIVE
Interval History: see above    Review of Systems   Constitutional:  Positive for activity change.   HENT:  Negative for trouble swallowing.    Respiratory:  Negative for cough and shortness of breath.    Cardiovascular:  Negative for chest pain and leg swelling.   Gastrointestinal:  Positive for abdominal pain. Negative for constipation, nausea and vomiting.   Genitourinary:  Negative for difficulty urinating.   Musculoskeletal:  Positive for arthralgias (right hip). Negative for neck stiffness.   Psychiatric/Behavioral:  Negative for agitation, behavioral problems and confusion.    Objective:     Vital Signs (Most Recent):  Temp: 98.2 °F (36.8 °C) (02/04/23 0637)  Pulse: 71 (02/04/23 0732)  Resp: 14 (02/04/23 0732)  BP: (!) 90/51 (02/04/23 0637)  SpO2: 97 % (02/04/23 0733)   Vital Signs (24h Range):  Temp:  [97.5 °F (36.4 °C)-99.4 °F (37.4 °C)] 98.2 °F (36.8 °C)  Pulse:  [65-82] 71  Resp:  [14-20] 14  SpO2:  [92 %-97 %] 97 %  BP: ()/(45-58) 90/51     Weight: 62 kg (136 lb 11 oz)  Body mass index is 18.92 kg/m².    Intake/Output Summary (Last 24 hours) at 2/4/2023 0809  Last data filed at 2/3/2023 1301  Gross per 24 hour   Intake 4000 ml   Output 1095 ml   Net 2905 ml      Physical Exam  Constitutional:       General: He is not in acute distress.     Appearance: Normal appearance. He is obese. He is not ill-appearing, toxic-appearing or diaphoretic.   HENT:      Head: Normocephalic and atraumatic.      Nose: Nose normal.      Mouth/Throat:      Mouth: Mucous membranes are moist.      Pharynx: Oropharynx is clear.   Eyes:      General: No scleral icterus.     Extraocular Movements: Extraocular movements intact.      Conjunctiva/sclera: Conjunctivae normal.      Pupils: Pupils are equal, round, and reactive to light.   Cardiovascular:      Rate and Rhythm: Normal rate and regular rhythm.      Pulses: Normal pulses.      Heart sounds: Normal heart sounds. No murmur heard.  Pulmonary:      Effort: Pulmonary effort  is normal. No respiratory distress.      Breath sounds: Normal breath sounds. No stridor. No wheezing, rhonchi or rales.   Chest:      Chest wall: No tenderness.   Abdominal:      General: Abdomen is flat. Bowel sounds are normal. There is no distension.      Palpations: Abdomen is soft.      Tenderness: There is no abdominal tenderness. There is no right CVA tenderness, guarding or rebound.   Musculoskeletal:         General: No swelling, tenderness, deformity or signs of injury. Normal range of motion.      Cervical back: Normal range of motion and neck supple. No rigidity or tenderness.      Right lower leg: No edema.      Left lower leg: No edema.      Comments: Right hip dressing clean and intact   Skin:     General: Skin is warm and dry.      Coloration: Skin is not jaundiced.      Findings: No erythema or rash.   Neurological:      General: No focal deficit present.      Mental Status: He is alert and oriented to person, place, and time. Mental status is at baseline.      Motor: No weakness.   Psychiatric:         Mood and Affect: Mood normal.         Behavior: Behavior normal.         Thought Content: Thought content normal.         Judgment: Judgment normal.       Significant Labs: All pertinent labs within the past 24 hours have been reviewed.  CBC:   Recent Labs   Lab 02/02/23  1740 02/03/23  0344 02/03/23  1321   WBC 11.54 8.98 11.98   HGB 14.7 13.6* 10.4*   HCT 46.3 43.0 33.6*    175 139*     CMP:   Recent Labs   Lab 02/02/23  1740 02/03/23  0344    137   K 4.3 4.1    107   CO2 28 22*   * 115*   BUN 24* 18   CREATININE 0.9 0.7   CALCIUM 8.8 8.2*   PROT 6.5 5.6*   ALBUMIN 3.3* 2.9*   BILITOT 1.3* 1.2*   ALKPHOS 73 65   AST 18 15   ALT 18 14   ANIONGAP 5* 8       Significant Imaging: I have reviewed all pertinent imaging results/findings within the past 24 hours.

## 2023-02-04 NOTE — ASSESSMENT & PLAN NOTE
Agustin Hernandes is a 82 y.o. male with right varus impacted femoral neck fracture, closed, NVI. They are not anticoagulation at home. They do not require ambulatory assistive devices prior to this injury.  Plan for OR today for R anterior DHEERAJ vs hemiarthroplasty    s/p R DHEERAJ on 2/3/23  Surgical dressing C/D/I  Pain control: multimodal  PT/OT: WBAT RLE, anterior hip precautions  DVT PPx: Eliquis BID, FCDs at all times when not ambulating  Abx: postop Ancef  Drain: none  Oshea: remove POD1    Dispo: pending PT and pain control

## 2023-02-04 NOTE — SUBJECTIVE & OBJECTIVE
"Principal Problem:Closed displaced fracture of right femoral neck    Principal Orthopedic Problem: Same    Interval History: YORDANONEILON, patient stable, pain controlled. No acute complaints this morning. AM labs pending. Not seen by PT yet. Dressings CDI.     Review of patient's allergies indicates:  No Known Allergies    Current Facility-Administered Medications   Medication    acetaminophen tablet 1,000 mg    apixaban tablet 2.5 mg    bisacodyL suppository 10 mg    celecoxib capsule 100 mg    fluticasone furoate-vilanteroL 100-25 mcg/dose diskus inhaler 1 puff    melatonin tablet 6 mg    methocarbamoL tablet 500 mg    methocarbamoL tablet 500 mg    ondansetron injection 4 mg    polyethylene glycol packet 17 g    senna-docusate 8.6-50 mg per tablet 1 tablet    sodium chloride 0.9% flush 10 mL    sodium chloride 0.9% flush 10 mL     Objective:     Vital Signs (Most Recent):  Temp: 98.2 °F (36.8 °C) (02/04/23 0637)  Pulse: 77 (02/04/23 0637)  Resp: 17 (02/04/23 0637)  BP: (!) 90/51 (02/04/23 0637)  SpO2: (!) 92 % (02/04/23 0637)   Vital Signs (24h Range):  Temp:  [97.5 °F (36.4 °C)-99.4 °F (37.4 °C)] 98.2 °F (36.8 °C)  Pulse:  [65-82] 77  Resp:  [14-20] 17  SpO2:  [92 %-96 %] 92 %  BP: ()/(45-58) 90/51     Weight: 62 kg (136 lb 11 oz)  Height: 5' 11.26" (181 cm)  Body mass index is 18.92 kg/m².      Intake/Output Summary (Last 24 hours) at 2/4/2023 0707  Last data filed at 2/3/2023 1301  Gross per 24 hour   Intake 4000 ml   Output 1095 ml   Net 2905 ml       Ortho/SPM Exam  AAOx4  NAD  Reg rate  No increased WOB    RLE:  Dressing c/d/i  SILT T/SP/DP/Montoya/Sa  Motor intact T/SP/DP  WWP extremities  FCDs in place and functioning    Significant Labs:   Recent Lab Results         02/03/23  1321        Baso # 0.03       Basophil % 0.3       Differential Method Automated       Eos # 0.2       Eosinophil % 1.4       Gran # (ANC) 10.4       Gran % 86.3       Hematocrit 33.6       Hemoglobin 10.4       Immature Grans (Abs) " 0.06  Comment: Mild elevation in immature granulocytes is non specific and   can be seen in a variety of conditions including stress response,   acute inflammation, trauma and pregnancy. Correlation with other   laboratory and clinical findings is essential.         Immature Granulocytes 0.5       Lymph # 0.4       Lymph % 3.3       MCH 28.9       MCHC 31.0       MCV 93       Mono # 1.0       Mono % 8.2       MPV 10.8       nRBC 0       Platelets 139       RBC 3.60       RDW 13.8       WBC 11.98             All pertinent labs within the past 24 hours have been reviewed.    Significant Imaging: I have reviewed and interpreted all pertinent imaging results/findings.

## 2023-02-04 NOTE — PT/OT/SLP EVAL
"Physical Therapy  Evaluation and Treatment    Agustin Hernandes   [id number]    Time Tracking:     PT Received On: 02/04/23   PT Start Time: 1140   PT Stop Time: 1212   PT Total Time (min): 32 min    Billable Minutes: Evaluation 8, Gait Training 13, and Neuromuscular Re-education 11  minutes    Recommendations:     Discharge recommendations: Home with family and HH PT/OT     Equipment recommendations: Rolling Walker    Barriers to Discharge: None    Patient Information:     Recent Surgery: Procedure(s) (LRB):  ARTHROPLASTY, HIP, TOTAL, ANTERIOR APPROACH (Right) 1 Day Post-Op    Diagnosis: Closed displaced fracture of right femoral neck    Length of Stay: 2 days    General Precautions: Standard, fall  Orthopedic Precautions: RLE WBAT, RLE anterior precautions (avoid extremes of motion)  Brace: None    Assessment:     Agustin Hernandes is a 82 y.o. [place] [facility] [place] Closed displaced fracture of right femoral neck after fall at home (visiting from [place] to visit son), underwent R anterior DHEERAJ with Dr. Nunez on 2/3. Agustin Hernandes tolerated evaluation well today. He was very pleasant and agreeable to session, family member at bedside providing interpretation (patient is primarily Maori-speaking). Educated pt and family on orthopedic precautions, ok to weight-bear as tolerated to RLE but to avoid extremes of motion at R hip. Able to stand from bed with CGA using RW, no initial pain with RLE weightbearing. Ambulates ~130 ft in hallways on room air with rolling walker; initially requiring min (A) to control pace of walker as well as cueing for 3-pt gait sequencing (to offload RLE as needed) but progressed to SBA with time. Became more fatigued by end of gait trial with 1/10 R hip pain; sitting up in chair, RN into room to inform that telemetry with bigeminy and trigeminy. Pt reporting "I felt that my heart was racing but I feel fine." Up in chair with o2 intact, pt comfortable, rolling walker " "at bedside (ok to be up with nursing using the RW). Discussed PT role, POC, goals and recommendations (Home with family and HH; rolling walker) with patient; verbalized understanding. Agustin Hernandes would benefit from acute PT services to promote mobility during this admission and improve return to PLOF.    Problem List: weakness, decreased endurance, impaired self-care skills, impaired mobility, decreased sitting or standing balance, gait instability, orthopedic precautions, impaired cardiopulmonary response to activity, and pain    Rehab Prognosis: Good; patient would benefit from acute skilled PT services to address these deficits and reach maximum level of function.    Plan:     Patient to be seen daily to address the above listed problems via gait training, therapeutic activities, therapeutic exercises, neuromuscular re-education    Plan of Care Expires: 03/06/23  Plan of Care reviewed with: patient, family    Subjective:     Communicated with CHRISTIANE Villegas prior to evaluation, appropriate to see for evaluation.    Pt found supine in bed (HOB elevated) upon PT entry to room, agreeable to evaluation. Family member at bedside interpreting for patient (he is primarily [place]-speaking)    Patient commenting: "I'm used to being very independent, I am always moving and walking." (In [place] Island[place])    Does this patient have any cultural, spiritual, Christian conflicts given the current situation? Patient has no barriers to learning. Patient verbalizes understanding of his/her program and goals and demonstrates them correctly. No cultural, spiritual, or educational needs identified.    History reviewed. No pertinent past medical history. No past surgical history on file.    Living Environment:  Pt typically lives in [place] but he is currently visiting his son. He will be staying with his son upon D/C in a 2  with 1 GARCÍA, stays downstairs at son's home.    PLOF:  Prior to admission, patient was independent for mobility " "and self-care without any device.    DME:  Patient owns or has access to the following DME: None    Upon discharge, patient will have assistance from son.    Objective:     Patient found with: telemetry, oxygen, woens catheter    Pain:  Pain Rating 1: 0/10 at rest in bed  Pain Rating Post-Intervention 1: 1/10 at R hip by end of gait trial    Cognitive Exam:  Patient is oriented to Person, Place, Time, and Situation.  Patient follows 100% of single-step commands.    Sensation:   Intact at BLE to LT    Lower Extremity Range of Motion:  Right Lower Extremity: WFL actively  Left Lower Extremity: WFL actively    Lower Extremity Strength:  Right Lower Extremity: grossly 3+/5 via MMT (didn't assess any extremes of R hip motion per precautions)  Left Lower Extremity: WFL    Functional Mobility:    Bed Mobility:  Supine to Sitting: stand-by assist with HOB elevated, increased time, use of side rail    Transfers:  Sit to Stand: contact-guard assistance from EOB with RW x 1 trial(s)    Gait:  130 feet in hallways on room air with rolling walker; initially requiring min (A) to control pace of walker as well as cueing for 3-pt gait sequencing (to offload RLE as needed) but progressed to SBA with time.  Became more fatigued by end of gait trial with 1/10 R hip pain; sitting up in chair, RN into room to inform that telemetry with bigeminy and trigeminy.  Pt reporting "I felt that my heart was racing but I feel fine."    Assist level:  SBA-min A (see above for explanation)  Device: Rolling walker    Balance:  Static Sit: Supervision at EOB    Static Stand: Stand-By Assist with Rolling walker    Additional Therapeutic Activity/Exercises:     1. He was very pleasant and agreeable to session, family member at bedside providing interpretation (patient is primarily Hungarian-speaking). Educated pt and family on orthopedic precautions, ok to weight-bear as tolerated to RLE but to avoid extremes of motion at R hip.    2. Able to stand from " "bed with CGA using RW, no initial pain with RLE weightbearing. Ambulates ~130 ft in hallways on room air with rolling walker; initially requiring min (A) to control pace of walker as well as cueing for 3-pt gait sequencing (to offload RLE as needed) but progressed to SBA with time. Became more fatigued by end of gait trial with 1/10 R hip pain; sitting up in chair, RN into room to inform that telemetry with bigeminy and trigeminy. Pt reporting "I felt that my heart was racing but I feel fine."    3. Up in chair with o2 intact, pt comfortable, rolling walker at bedside (ok to be up with nursing using the RW). Discussed PT role, POC, goals and recommendations (Home with family and HH; rolling walker) with patient; verbalized understanding.    AM-PAC 6 CLICK MOBILITY  Turning over in bed (including adjusting bedclothes, sheets and blankets)?: 3  Sitting down on and standing up from a chair with arms (e.g., wheelchair, bedside commode, etc.): 3  Moving from lying on back to sitting on the side of the bed?: 4  Moving to and from a bed to a chair (including a wheelchair)?: 3  Need to walk in hospital room?: 3  Climbing 3-5 steps with a railing?: 2  Basic Mobility Total Score: 18    Patient was left reclined in bedside chair with all lines intact, call button in reach, RN notified, and family present.    Clinical Decision Making for Evaluation Complexity:  1. Body System(s) Examination: 1-2  2. Clinical Presentation: Evolving  3. Evaluation Complexity: Low    GOALS:   Multidisciplinary Problems       Physical Therapy Goals          Problem: Physical Therapy    Goal Priority Disciplines Outcome Goal Variances Interventions   Physical Therapy Goal     PT, PT/OT      Description: Goals to be met by: 23     Patient will increase functional independence with mobility by performin. Supine to sit with Modified [place] - Not met  2. Sit to stand transfer with Supervision with RW - Not met  3. Gait  x 200 feet with " Stand-by Assistance using Rolling Walker - Not met  4. Ascend/Descend 6 inch curb step with Contact Guard Assistance using Rolling Walker - Not met  5. Pt will verbalize RLE orthopedic precautions without cueing (RLE WBAT, no extremes of motion) - Richie Khanna, PT  2/4/2023

## 2023-02-04 NOTE — PLAN OF CARE
Patient AAOX4, call light and belongings in reach, siderails up x2, up with walker and x1 person assist.  RLE surgical dressing c/d/I, pain controlled this shift.  Tolerating diet with no complaints of n/v, due to void post owens removal, no BM this shift.  Remains free from falls and safety maintained this shift.     Problem: Adult Inpatient Plan of Care  Goal: Plan of Care Review  Outcome: Ongoing, Progressing  Goal: Patient-Specific Goal (Individualized)  Outcome: Ongoing, Progressing  Goal: Absence of Hospital-Acquired Illness or Injury  Outcome: Ongoing, Progressing  Goal: Optimal Comfort and Wellbeing  Outcome: Ongoing, Progressing     Problem: Bleeding (Surgery Nonspecified)  Goal: Absence of Bleeding  Outcome: Ongoing, Progressing     Problem: Fluid and Electrolyte Imbalance (Surgery Nonspecified)  Goal: Fluid and Electrolyte Balance  Outcome: Ongoing, Progressing     Problem: Respiratory Compromise (Surgery Nonspecified)  Goal: Effective Oxygenation and Ventilation  Outcome: Ongoing, Not Progressing     Problem: Infection (Anesthesia/Analgesia, Neuraxial)  Goal: Absence of Infection Signs and Symptoms  Outcome: Ongoing, Progressing     Problem: Nausea and Vomiting (Anesthesia/Analgesia, Neuraxial)  Goal: Nausea and Vomiting Relief  Outcome: Ongoing, Progressing

## 2023-02-05 PROCEDURE — 25000003 PHARM REV CODE 250: Performed by: HOSPITALIST

## 2023-02-05 PROCEDURE — 94761 N-INVAS EAR/PLS OXIMETRY MLT: CPT

## 2023-02-05 PROCEDURE — 99232 PR SUBSEQUENT HOSPITAL CARE,LEVL II: ICD-10-PCS | Mod: ,,, | Performed by: HOSPITALIST

## 2023-02-05 PROCEDURE — 97116 GAIT TRAINING THERAPY: CPT | Mod: CQ

## 2023-02-05 PROCEDURE — 94640 AIRWAY INHALATION TREATMENT: CPT

## 2023-02-05 PROCEDURE — 99232 SBSQ HOSP IP/OBS MODERATE 35: CPT | Mod: ,,, | Performed by: HOSPITALIST

## 2023-02-05 PROCEDURE — 25000003 PHARM REV CODE 250: Performed by: STUDENT IN AN ORGANIZED HEALTH CARE EDUCATION/TRAINING PROGRAM

## 2023-02-05 PROCEDURE — 99900035 HC TECH TIME PER 15 MIN (STAT)

## 2023-02-05 PROCEDURE — 97535 SELF CARE MNGMENT TRAINING: CPT

## 2023-02-05 PROCEDURE — 25000003 PHARM REV CODE 250

## 2023-02-05 PROCEDURE — 27000221 HC OXYGEN, UP TO 24 HOURS

## 2023-02-05 PROCEDURE — 11000001 HC ACUTE MED/SURG PRIVATE ROOM

## 2023-02-05 RX ORDER — CELECOXIB 100 MG/1
100 CAPSULE ORAL DAILY PRN
Qty: 30 CAPSULE | Refills: 0 | Status: SHIPPED | OUTPATIENT
Start: 2023-02-05

## 2023-02-05 RX ORDER — METHOCARBAMOL 500 MG/1
500 TABLET, FILM COATED ORAL EVERY 6 HOURS PRN
Qty: 30 TABLET | Refills: 0 | Status: SHIPPED | OUTPATIENT
Start: 2023-02-05 | End: 2023-02-15

## 2023-02-05 RX ORDER — FLUTICASONE PROPIONATE AND SALMETEROL 250; 50 UG/1; UG/1
1 POWDER RESPIRATORY (INHALATION) 2 TIMES DAILY
Qty: 60 EACH | Refills: 11 | Status: SHIPPED | OUTPATIENT
Start: 2023-02-05 | End: 2024-02-05

## 2023-02-05 RX ORDER — ACETAMINOPHEN 500 MG
1000 TABLET ORAL EVERY 8 HOURS PRN
Qty: 30 TABLET | Refills: 0 | Status: SHIPPED | OUTPATIENT
Start: 2023-02-05

## 2023-02-05 RX ORDER — FLUTICASONE FUROATE AND VILANTEROL 100; 25 UG/1; UG/1
1 POWDER RESPIRATORY (INHALATION) DAILY
Qty: 60 EACH | Refills: 0 | Status: SHIPPED | OUTPATIENT
Start: 2023-02-06 | End: 2023-02-05 | Stop reason: HOSPADM

## 2023-02-05 RX ADMIN — METHOCARBAMOL 500 MG: 500 TABLET ORAL at 05:02

## 2023-02-05 RX ADMIN — ACETAMINOPHEN 1000 MG: 325 TABLET ORAL at 06:02

## 2023-02-05 RX ADMIN — CELECOXIB 100 MG: 100 CAPSULE ORAL at 09:02

## 2023-02-05 RX ADMIN — ACETAMINOPHEN 1000 MG: 325 TABLET ORAL at 12:02

## 2023-02-05 RX ADMIN — FLUTICASONE FUROATE AND VILANTEROL TRIFENATATE 1 PUFF: 100; 25 POWDER RESPIRATORY (INHALATION) at 09:02

## 2023-02-05 RX ADMIN — POLYETHYLENE GLYCOL 3350 17 G: 17 POWDER, FOR SOLUTION ORAL at 09:02

## 2023-02-05 RX ADMIN — METHOCARBAMOL 500 MG: 500 TABLET ORAL at 12:02

## 2023-02-05 RX ADMIN — METHOCARBAMOL 500 MG: 500 TABLET ORAL at 08:02

## 2023-02-05 RX ADMIN — SENNOSIDES AND DOCUSATE SODIUM 1 TABLET: 50; 8.6 TABLET ORAL at 08:02

## 2023-02-05 RX ADMIN — METHOCARBAMOL 500 MG: 500 TABLET ORAL at 09:02

## 2023-02-05 RX ADMIN — SENNOSIDES AND DOCUSATE SODIUM 1 TABLET: 50; 8.6 TABLET ORAL at 09:02

## 2023-02-05 RX ADMIN — APIXABAN 2.5 MG: 2.5 TABLET, FILM COATED ORAL at 09:02

## 2023-02-05 RX ADMIN — APIXABAN 2.5 MG: 2.5 TABLET, FILM COATED ORAL at 08:02

## 2023-02-05 RX ADMIN — Medication 6 MG: at 07:02

## 2023-02-05 NOTE — PROGRESS NOTES
"[facility] - [facility]  Orthopedics  Progress Note    Patient Name: Agustin Hernandes  MRN: [id number]  Admission Date: 2/2/2023  Hospital Length of Stay: 3 days  Attending Provider: Alexa Ibarra MD  Primary Care Provider: Susy Oneil MD  Follow-up For: Procedure(s) (LRB):  ARTHROPLASTY, HIP, TOTAL, ANTERIOR APPROACH (Right)    Post-Operative Day: 2 Days Post-Op  Subjective:     Principal Problem:Closed displaced fracture of right femoral neck    Principal Orthopedic Problem: Same    Interval History: NAEON, patient stable, pain controlled. No acute complaints this morning. Dressings CDI. 130ft with PT yesterday.     Review of patient's allergies indicates:  No Known Allergies    Current Facility-Administered Medications   Medication    acetaminophen tablet 1,000 mg    apixaban tablet 2.5 mg    bisacodyL suppository 10 mg    celecoxib capsule 100 mg    fluticasone furoate-vilanteroL 100-25 mcg/dose diskus inhaler 1 puff    melatonin tablet 6 mg    methocarbamoL tablet 500 mg    methocarbamoL tablet 500 mg    ondansetron injection 4 mg    polyethylene glycol packet 17 g    senna-docusate 8.6-50 mg per tablet 1 tablet    sodium chloride 0.9% flush 10 mL    sodium chloride 0.9% flush 10 mL     Objective:     Vital Signs (Most Recent):  Temp: 98 °F (36.7 °C) (02/05/23 0630)  Pulse: 75 (02/05/23 0423)  Resp: 18 (02/05/23 0423)  BP: (!) 102/55 (02/05/23 0423)  SpO2: 95 % (02/05/23 0423)   Vital Signs (24h Range):  Temp:  [97 °F (36.1 °C)-99.9 °F (37.7 °C)] 98 °F (36.7 °C)  Pulse:  [71-92] 75  Resp:  [14-18] 18  SpO2:  [79 %-97 %] 95 %  BP: ()/(49-60) 102/55     Weight: 62 kg (136 lb 11 oz)  Height: 5' 11.26" (181 cm)  Body mass index is 18.92 kg/m².      Intake/Output Summary (Last 24 hours) at 2/5/2023 0720  Last data filed at 2/5/2023 0415  Gross per 24 hour   Intake 990 ml   Output 2130 ml   Net -1140 ml         Ortho/SPM Exam  AAOx4  NAD  Reg rate  No increased WOB    RLE:  Dressing c/d/i  SILT " T/SP/DP/Montoya/Sa  Motor intact T/SP/DP  WWP extremities  FCDs in place and functioning    Significant Labs:   Recent Lab Results         02/04/23  0825        Albumin 2.5       Alkaline Phosphatase 57       ALT 20       Anion Gap 7       AST 30       Baso # 0.02       Basophil % 0.2       BILIRUBIN TOTAL 1.2  Comment: For infants and newborns, interpretation of results should be based  on gestational age, weight and in agreement with clinical  observations.    Premature Infant recommended reference ranges:  Up to 24 hours.............<8.0 mg/dL  Up to 48 hours............<12.0 mg/dL  3-5 days..................<15.0 mg/dL  6-29 days.................<15.0 mg/dL         BUN 15       Calcium 7.4       Chloride 106       CO2 24       Creatinine 0.8       Differential Method Automated       eGFR >60.0       Eos # 0.1       Eosinophil % 1.3       Glucose 130       Gran # (ANC) 9.2       Gran % 82.1       Hematocrit 29.0       Hemoglobin 9.1       Immature Grans (Abs) 0.04  Comment: Mild elevation in immature granulocytes is non specific and   can be seen in a variety of conditions including stress response,   acute inflammation, trauma and pregnancy. Correlation with other   laboratory and clinical findings is essential.         Immature Granulocytes 0.4       Lymph # 0.7       Lymph % 6.0       MCH 29.3       MCHC 31.4       MCV 93       Mono # 1.1       Mono % 10.0       MPV 11.5       nRBC 0       Platelets 157       Potassium 4.0       PROTEIN TOTAL 4.7       RBC 3.11       RDW 14.0       Sodium 137       WBC 11.17             All pertinent labs within the past 24 hours have been reviewed.    Significant Imaging: I have reviewed and interpreted all pertinent imaging results/findings.    Assessment/Plan:     * Closed displaced fracture of right femoral neck  Agustin Hernandes is a 82 y.o. male with right varus impacted femoral neck fracture, closed, NVI. They are not anticoagulation at home. They do not require ambulatory  98.6 assistive devices prior to this injury.  Plan for OR today for R anterior DHEERAJ vs hemiarthroplasty    s/p R DHEERAJ on 2/3/23  Surgical dressing C/D/I  Pain control: multimodal  PT/OT: WBAT RLE, anterior hip precautions  DVT PPx: Eliquis BID, FCDs at all times when not ambulating  Abx: postop Ancef  Drain: none  [place]: remove POD1    Dispo: pending PT and pain control             Kobe Sloan MD  Orthopedics  [facility] - Surgery

## 2023-02-05 NOTE — PT/OT/SLP EVAL
Occupational Therapy   Evaluation    Name: Agustin Hernandes  MRN: [id number]  Admitting Diagnosis: Closed displaced fracture of right femoral neck  Recent Surgery: Procedure(s) (LRB):  ARTHROPLASTY, HIP, TOTAL, ANTERIOR APPROACH (Right) 1 Day Post-Op    Recommendations:     Discharge Recommendations: home health OT, home health PT  Discharge Equipment Recommendations:  walker, rolling  Barriers to discharge:  None    Assessment:     Agustin Hernandes is a 82 y.o. male with a medical diagnosis of Closed displaced fracture of right femoral neck.  He presents with the following performance deficits affecting function: weakness, impaired endurance, impaired self care skills, impaired functional mobility, gait instability, decreased lower extremity function, pain, decreased ROM, orthopedic precautions, impaired joint extensibility, impaired cardiopulmonary response to activity, impaired balance.  Patient's family friend present and providing translating, educated on precautions and techniques, will continue to benefit from therapy during this admission per OT POC.    Rehab Prognosis: Good; patient would benefit from acute skilled OT services to address these deficits and reach maximum level of function.       Plan:     Patient to be seen daily to address the above listed problems via self-care/home management, therapeutic activities, therapeutic exercises, neuromuscular re-education  Plan of Care Expires: 03/04/23  Plan of Care Reviewed with: patient, family    Subjective     Chief Complaint: No complaints  Patient/Family Comments/goals: Participate with therapy and     Occupational Profile:  Living Environment: Pt will return to his son's 2SH, 1 step to enter, t/s combo with a built in collapsible bench   Previous level of function: Independent and very active  Roles and Routines: Farming, 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  Patient found supine with owens catheter, oxygen, telemetry upon OT entry to room.    General Precautions: Standard, fall  Orthopedic Precautions: RLE weight bearing as tolerated, RLE anterior precautions  Braces: N/A  Respiratory Status: Nasal cannula, flow 2 L/min    Occupational Performance:    Bed Mobility:    Patient completed Rolling/Turning to Left with  stand by assistance  Patient completed Scooting/Bridging with stand by assistance  Patient completed Supine to Sit with stand by assistance    Functional Mobility/Transfers:  Patient completed Sit <> Stand Transfer with contact guard assistance  with  rolling walker   Patient completed Bed <> Chair Transfer using Step Transfer technique with contact guard assistance with rolling walker  Functional Mobility: CGA-min A with rolling walker within room and hallway; required cues for pacing, step sequence, turning/direction change, and safety prior to stand <> sit    Activities of Daily Living:  Grooming: stand by assistance seated edge of bed  Upper Body Dressing: minimum assistance seated edge of bed to don gown  Lower Body Dressing: minimum assistance seated edge of bed; cues for technique to complete 4-point technique safely     Cognitive/Visual Perceptual:  Cognitive/Psychosocial Skills:     -       Oriented to: Person, Place, Time, and Situation   -       Follows Commands/attention:Follows multistep  commands  -       Safety awareness/insight to disability: impaired   -       Mood/Affect/Coping skills/emotional control: Appropriate to situation    Physical Exam:  Postural examination/scapula alignment:    -       No postural abnormalities  identified  Upper Extremity Range of Motion:     -       Right Upper Extremity: WFL  -       Left Upper Extremity: WFL  Upper Extremity Strength:    -       Right Upper Extremity: WFL  -       Left Upper Extremity: WFL   Strength:    -       Right Upper Extremity: WFL  -       Left Upper Extremity: WFL    AMPAC 6 Click ADL:  AMPAC Total Score: 17    Treatment & Education:  -Evaluation completed, plan of care established.  -Patient and family educated on roles/goals of OT and POC.  -White board updated.  -Therapist provided time for questions and answered within scope of practice.  -Patient educated on importance of EOB/OOB activity to maximize recovery.     Patient left up in chair with all lines intact and call button in reach    GOALS:   Multidisciplinary Problems       Occupational Therapy Goals          Problem: Occupational Therapy    Goal Priority Disciplines Outcome Interventions   Occupational Therapy Goal     OT, PT/OT Ongoing, Progressing    Description: Goals to be met by: 3/4/23     Patient will increase functional independence with ADLs by performing:    UE Dressing with Set-up Assistance.  LE Dressing with Set-up Assistance.  Grooming while standing at sink with Set-up Assistance.  Toileting from toilet with Modified [place] for hygiene and clothing management.   Supine to sit with Modified [place].  Step transfer with Modified [place]  Toilet transfer to toilet with Modified [place].                         History:     History reviewed. No pertinent past medical history.    No past surgical history on file.    Time Tracking:     OT Date of Treatment: 02/04/23  OT Start Time: 1140  OT Stop Time: 1212  OT Total Time (min): 32 min    Billable Minutes:Evaluation 10  Self Care/Home Management 10  Therapeutic Activity 12    2/4/2023

## 2023-02-05 NOTE — ASSESSMENT & PLAN NOTE
-Pt. With chronic COPD on 2L NC at home Currently stable, CXR without any acute process  -Duo-nebs PRN, continue home ICS/LAMA monitor with intermitent pulse oximetry  2/5- oxygen 2 liters , stable

## 2023-02-05 NOTE — SUBJECTIVE & OBJECTIVE
"Principal Problem:Closed displaced fracture of right femoral neck    Principal Orthopedic Problem: Same    Interval History: ALYSONON, patient stable, pain controlled. No acute complaints this morning. Dressings CDI. 130ft with PT yesterday.     Review of patient's allergies indicates:  No Known Allergies    Current Facility-Administered Medications   Medication    acetaminophen tablet 1,000 mg    apixaban tablet 2.5 mg    bisacodyL suppository 10 mg    celecoxib capsule 100 mg    fluticasone furoate-vilanteroL 100-25 mcg/dose diskus inhaler 1 puff    melatonin tablet 6 mg    methocarbamoL tablet 500 mg    methocarbamoL tablet 500 mg    ondansetron injection 4 mg    polyethylene glycol packet 17 g    senna-docusate 8.6-50 mg per tablet 1 tablet    sodium chloride 0.9% flush 10 mL    sodium chloride 0.9% flush 10 mL     Objective:     Vital Signs (Most Recent):  Temp: 98 °F (36.7 °C) (02/05/23 0630)  Pulse: 75 (02/05/23 0423)  Resp: 18 (02/05/23 0423)  BP: (!) 102/55 (02/05/23 0423)  SpO2: 95 % (02/05/23 0423)   Vital Signs (24h Range):  Temp:  [97 °F (36.1 °C)-99.9 °F (37.7 °C)] 98 °F (36.7 °C)  Pulse:  [71-92] 75  Resp:  [14-18] 18  SpO2:  [79 %-97 %] 95 %  BP: ()/(49-60) 102/55     Weight: 62 kg (136 lb 11 oz)  Height: 5' 11.26" (181 cm)  Body mass index is 18.92 kg/m².      Intake/Output Summary (Last 24 hours) at 2/5/2023 0720  Last data filed at 2/5/2023 0415  Gross per 24 hour   Intake 990 ml   Output 2130 ml   Net -1140 ml         Ortho/SPM Exam  AAOx4  NAD  Reg rate  No increased WOB    RLE:  Dressing c/d/i  SILT T/SP/DP/[place]/Sa  Motor intact T/SP/DP  WWP extremities  FCDs in place and functioning    Significant Labs:   Recent Lab Results         02/04/23  0825        Albumin 2.5       Alkaline Phosphatase 57       ALT 20       Anion Gap 7       AST 30       Baso # 0.02       Basophil % 0.2       BILIRUBIN TOTAL 1.2  Comment: For infants and newborns, interpretation of results should be based  on " gestational age, weight and in agreement with clinical  observations.    Premature Infant recommended reference ranges:  Up to 24 hours.............<8.0 mg/dL  Up to 48 hours............<12.0 mg/dL  3-5 days..................<15.0 mg/dL  6-29 days.................<15.0 mg/dL         BUN 15       Calcium 7.4       Chloride 106       CO2 24       Creatinine 0.8       Differential Method Automated       eGFR >60.0       Eos # 0.1       Eosinophil % 1.3       Glucose 130       Gran # (ANC) 9.2       Gran % 82.1       Hematocrit 29.0       Hemoglobin 9.1       Immature Grans (Abs) 0.04  Comment: Mild elevation in immature granulocytes is non specific and   can be seen in a variety of conditions including stress response,   acute inflammation, trauma and pregnancy. Correlation with other   laboratory and clinical findings is essential.         Immature Granulocytes 0.4       Lymph # 0.7       Lymph % 6.0       MCH 29.3       MCHC 31.4       MCV 93       Mono # 1.1       Mono % 10.0       MPV 11.5       nRBC 0       Platelets 157       Potassium 4.0       PROTEIN TOTAL 4.7       RBC 3.11       RDW 14.0       Sodium 137       WBC 11.17             All pertinent labs within the past 24 hours have been reviewed.    Significant Imaging: I have reviewed and interpreted all pertinent imaging results/findings.

## 2023-02-05 NOTE — PLAN OF CARE
Problem: Adult Inpatient Plan of Care  Goal: Plan of Care Review  Outcome: Ongoing, Progressing  Goal: Patient-Specific Goal (Individualized)  Outcome: Ongoing, Progressing  Goal: Absence of Hospital-Acquired Illness or Injury  Outcome: Ongoing, Progressing  Goal: Optimal Comfort and Wellbeing  Outcome: Ongoing, Progressing     Problem: Infection  Goal: Absence of Infection Signs and Symptoms  Outcome: Ongoing, Progressing     Problem: Adjustment to Injury (Hip Fracture Medical Management)  Goal: Optimal Coping with Change in Health Status  Outcome: Ongoing, Progressing     Problem: Bleeding (Surgery Nonspecified)  Goal: Absence of Bleeding  Outcome: Ongoing, Progressing     Problem: Bowel Motility Impaired (Surgery Nonspecified)  Goal: Effective Bowel Elimination  Outcome: Ongoing, Not Progressing     Problem: Fluid and Electrolyte Imbalance (Surgery Nonspecified)  Goal: Fluid and Electrolyte Balance  Outcome: Ongoing, Progressing     Problem: Pain (Surgery Nonspecified)  Goal: Acceptable Pain Control  Outcome: Ongoing, Progressing     Problem: Postoperative Urinary Retention (Surgery Nonspecified)  Goal: Effective Urinary Elimination  Outcome: Ongoing, Progressing     Problem: Fall Injury Risk  Goal: Absence of Fall and Fall-Related Injury  Outcome: Ongoing, Progressing     Problem: Skin Injury Risk Increased  Goal: Skin Health and Integrity  Outcome: Ongoing, Progressing

## 2023-02-05 NOTE — NURSING
Messaged Dr Ibarra patient and family asking if patient could get discharged today   1546 VTO Dr Ibarra/ patient to dc tomorrow after therapy if all ok, this nurse update patient and family

## 2023-02-05 NOTE — SUBJECTIVE & OBJECTIVE
Interval History: see above    Review of Systems   Constitutional:  Positive for activity change.   HENT:  Negative for trouble swallowing.    Respiratory:  Negative for cough and shortness of breath.    Cardiovascular:  Negative for chest pain and leg swelling.   Gastrointestinal:  Negative for abdominal pain, constipation, nausea and vomiting.   Genitourinary:  Negative for difficulty urinating.   Musculoskeletal:  Negative for arthralgias (right hip) and neck stiffness.   Psychiatric/Behavioral:  Negative for agitation, behavioral problems and confusion.    Objective:     Vital Signs (Most Recent):  Temp: 98 °F (36.7 °C) (02/05/23 0630)  Pulse: 73 (02/05/23 0740)  Resp: 18 (02/05/23 0423)  BP: (!) 102/55 (02/05/23 0423)  SpO2: 95 % (02/05/23 0423)   Vital Signs (24h Range):  Temp:  [97 °F (36.1 °C)-99.9 °F (37.7 °C)] 98 °F (36.7 °C)  Pulse:  [73-92] 73  Resp:  [14-18] 18  SpO2:  [79 %-97 %] 95 %  BP: ()/(49-60) 102/55     Weight: 62 kg (136 lb 11 oz)  Body mass index is 18.92 kg/m².    Intake/Output Summary (Last 24 hours) at 2/5/2023 0753  Last data filed at 2/5/2023 0630  Gross per 24 hour   Intake 1020 ml   Output 2230 ml   Net -1210 ml        Physical Exam  Constitutional:       General: He is not in acute distress.     Appearance: Normal appearance. He is obese. He is not ill-appearing, toxic-appearing or diaphoretic.   HENT:      Head: Normocephalic and atraumatic.      Nose: Nose normal.      Mouth/Throat:      Mouth: Mucous membranes are moist.      Pharynx: Oropharynx is clear.   Eyes:      General: No scleral icterus.     Extraocular Movements: Extraocular movements intact.      Conjunctiva/sclera: Conjunctivae normal.      Pupils: Pupils are equal, round, and reactive to light.   Cardiovascular:      Rate and Rhythm: Normal rate and regular rhythm.      Pulses: Normal pulses.      Heart sounds: Normal heart sounds. No murmur heard.  Pulmonary:      Effort: Pulmonary effort is normal. No  respiratory distress.      Breath sounds: Normal breath sounds. No stridor. No wheezing, rhonchi or rales.   Chest:      Chest wall: No tenderness.   Abdominal:      General: Abdomen is flat. Bowel sounds are normal. There is no distension.      Palpations: Abdomen is soft.      Tenderness: There is no abdominal tenderness. There is no right CVA tenderness, guarding or rebound.   Musculoskeletal:         General: No swelling, tenderness, deformity or signs of injury. Normal range of motion.      Cervical back: Normal range of motion and neck supple. No rigidity or tenderness.      Right lower leg: No edema.      Left lower leg: No edema.      Comments: Right hip dressing clean and intact   Skin:     General: Skin is warm and dry.      Coloration: Skin is not jaundiced.      Findings: No erythema or rash.   Neurological:      General: No focal deficit present.      Mental Status: He is alert and oriented to person, place, and time. Mental status is at baseline.      Motor: No weakness.   Psychiatric:         Mood and Affect: Mood normal.         Behavior: Behavior normal.         Thought Content: Thought content normal.         Judgment: Judgment normal.       Significant Labs: All pertinent labs within the past 24 hours have been reviewed.  CBC:   Recent Labs   Lab 02/03/23  1321 02/04/23  0825   WBC 11.98 11.17   HGB 10.4* 9.1*   HCT 33.6* 29.0*   * 157       CMP:   Recent Labs   Lab 02/04/23  0825      K 4.0      CO2 24   *   BUN 15   CREATININE 0.8   CALCIUM 7.4*   PROT 4.7*   ALBUMIN 2.5*   BILITOT 1.2*   ALKPHOS 57   AST 30   ALT 20   ANIONGAP 7*         Significant Imaging: I have reviewed all pertinent imaging results/findings within the past 24 hours.

## 2023-02-05 NOTE — ASSESSMENT & PLAN NOTE
-Orthopedic surgery consulted and plan to take patient to the OR tomorrow. Pt. NPO at midnight for surgical repair of hip fracture  -Pain control as per Hip Fracture Pathway with multimodal pain regimen with scheduled Tylenol and Lyrica 75 mg po nightly. Robaxin, oxycodone PRN. IV morphine PRN for pain not controlled by PO medications  -DVT prophylaxis pre-op with TEDs/SCDs.   -Check Vitamin D level to assess for Vitamin D deficiency due to concern for osteoporotic fracture.   -Plan to monitor daily electrolytes and H/H post-op.   -Start Lovenox 40 mg subcutaneous daily post-op after surgery for DVT prophylaxis and will need for a total of 28 days after hip fracture surgery  -Consult PT/OT post-op for gait training and strengthening and restoration of ADLs.   -Consult  and case management to assist with discharge planning for this patient after surgery.      Preoperative Cardiac evaluation  Cardiovascular Risk Assessment:  Non-emergent surgery.  No active cardiac problems (such as unstable angina, decompensated heart failure, significant uncontrolled arrhythmias or severe valvular disease).  Intermediate risk surgery.  Functional Status: He IS NOT able to climb a flight of stairs (> 4 METS) with no CP or SOB  His revised cardiac risk index is 0.     1 pt Each: Ischemic Heart Disease, Cerebrovascular Disease,                     CHF, DM, Creatinine > 2           Other Issues: Chronic hypoxic respiratory failure, no acute isses. Ok to proceed with surgery without further cardiac testing or medical optimization    2/3 - to OR today  2/5- stable, MRDC, may be able to go home w outpt PT vs SNF.

## 2023-02-05 NOTE — PT/OT/SLP PROGRESS
Occupational Therapy  Co Treatment w PT  CoTx performed to optimize pt participation and assessment of full functional capacity.       Name: Agustin Hernandes  MRN: [id number]  Admitting Diagnosis:  Closed displaced fracture of right femoral neck  2 Days Post-Op    Recommendations:     Discharge Recommendations: home health OT  Discharge Equipment Recommendations:  walker, rolling, shower chair  Barriers to discharge:  None    Assessment:     Agustin Hernandes is a 82 y.o. male with a medical diagnosis of Closed displaced fracture of right femoral neck.  He presents with good tolerance to session on this date completing toileting from toilet level requiring Min A and grab bars and func amb in hallway requiring CGA and RW. Pt family at bedside assisting w translation. Pt continues to require min-mod vcs for adhrence to safety and ROM precautions. Performance deficits affecting function are weakness, impaired endurance, impaired self care skills, impaired functional mobility, gait instability, impaired balance, decreased coordination, decreased lower extremity function, decreased safety awareness, orthopedic precautions, impaired cardiopulmonary response to activity.   Pt motivated to return home however not at baseline for ADL and functional mobility performance in addition to concerns of fall risk and would benefit from continued OT services at this time.    Rehab Prognosis:  Good; patient would benefit from acute skilled OT services to address these deficits and reach maximum level of function.       Plan:     Patient to be seen daily to address the above listed problems via self-care/home management, therapeutic activities, therapeutic exercises, neuromuscular re-education  Plan of Care Expires: 03/04/23  Plan of Care Reviewed with: patient, family    Subjective     Pain/Comfort:  Pain Rating 1: 0/10    Objective:     Communicated with: RN prior to session.  Patient found supine with owens catheter, oxygen,  telemetry upon OT entry to room.    General Precautions: Standard, fall    Orthopedic Precautions:RLE weight bearing as tolerated, RLE anterior precautions  Braces: N/A  Respiratory Status: Room air     Occupational Performance:     Bed Mobility:    Patient completed Supine to Sit with minimum assistance  Patient completed Sit to Supine with minimum assistance     Functional Mobility/Transfers:  Patient completed Sit <> Stand Transfer with contact guard assistance  with  rolling walker   Functional Mobility: pt completed functional ambulation in hallway to simulate household and community mobility requiring  CGA and RW    Activities of Daily Living:  Toileting: minimum assistance from toilet level completing jaquelin care w SBA      AMPAC 6 Click ADL: 18    Treatment & Education:  Pt educated on scope of practice and importance of daily functional mobility.   Pt educated on safety precautions during transfers  Pt updated on POC and discharge recc       Patient left supine with all lines intact, bed alarm on, RN notified, and family present    GOALS:   Multidisciplinary Problems       Occupational Therapy Goals          Problem: Occupational Therapy    Goal Priority Disciplines Outcome Interventions   Occupational Therapy Goal     OT, PT/OT Ongoing, Progressing    Description: Goals to be met by: 3/4/23     Patient will increase functional independence with ADLs by performing:    UE Dressing with Set-up Assistance.  LE Dressing with Set-up Assistance.  Grooming while standing at sink with Set-up Assistance.  Toileting from toilet with Modified [place] for hygiene and clothing management.   Supine to sit with Modified [place].  Step transfer with Modified [place]  Toilet transfer to toilet with Modified [place].                         Time Tracking:     OT Date of Treatment: 02/05/23  OT Start Time: 1452  OT Stop Time: 1514  OT Total Time (min): 22 min    Billable Minutes:Self Care/Home Management  22               2/5/2023

## 2023-02-05 NOTE — PROGRESS NOTES
[facility]  Progress Note    Patient Name: Agustin Hernandes  MRN: [id number]  Patient Class: IP- Inpatient   Admission Date: 2/2/2023  Length of Stay: 3 days  Attending Physician: Alexa Ibarra MD  Primary Care Provider: Susy Oneil MD        Subjective:     Principal Problem:Closed displaced fracture of right femoral neck        HPI:  83 yo M with PMHx of COPD on 2L home oxygen who presents for evaluation of R hip fracture noted on outpatient imaging. Pt. Son at bedside assists with history. Pt. Currently visiting his son, lives in [facility]. Yesterday evening, the patient tripped trying to get by a dog gate in his son's house and fell backwards onto his buttocks. He noted immeidate pain and difficulty bearing weight, but he was able to walk with asssitance, and his pain improved with ice and ibuprofen. The patient's pain continued to worsen today, however, particularly on his R hip, so his son took him for further evaluation. Outpatient X-rays were positive for    Acute mildly displaced right femoral neck fracture and patient was intructed to come to ED. At baseline, pt. Is independent and ambulatory. No reported SOB, chest pain, lightheadedness, or LOC that contributed to patient's fall yesterday.    In the ED, ortho was consulted. They are planning on surgery.  NPO since midnight  - Pt marked, booked, and consented for surgery  -DVT PPx: Hold anticoagulation  -Abx: Preop abx ordered  -Hgb 13.6, 1u pRBC on hold  - owens in place, UA with leukocytes, given 1g of rocephin  - Iv: ordered for contralateral arm      Overview/Hospital Course:  2/3 - BP 99/61 VSS. Saw pt in post op setting at 5 pm. Doing well. Wife at bedside they need a . BP 97/55 VSS,  SpO2 (!) 92% on  2 liters nc. (Home dose)  Ate dinner. Looks comfortable. PNC in place.     2/4- BP 90/51 VSS. s/p R DHEERAJ on 2/3/23. Pt eating. German speaking, Needs .  PT/OT: WBAT RLE, anterior hip precautions.   DVT PPx: Eliquis BID, FCDs at all times when not ambulating.  Abx: postop Ancef. Oshea: remove POD1  Dispo: pending PT and pain control . Lab stable    2/5- completed ancef, walking w PT down halls. /55 VSS. Speaking in Indonesian through . And expressed understanding.       Interval History: see above    Review of Systems   Constitutional:  Positive for activity change.   HENT:  Negative for trouble swallowing.    Respiratory:  Negative for cough and shortness of breath.    Cardiovascular:  Negative for chest pain and leg swelling.   Gastrointestinal:  Negative for abdominal pain, constipation, nausea and vomiting.   Genitourinary:  Negative for difficulty urinating.   Musculoskeletal:  Negative for arthralgias (right hip) and neck stiffness.   Psychiatric/Behavioral:  Negative for agitation, behavioral problems and confusion.    Objective:     Vital Signs (Most Recent):  Temp: 98 °F (36.7 °C) (02/05/23 0630)  Pulse: 73 (02/05/23 0740)  Resp: 18 (02/05/23 0423)  BP: (!) 102/55 (02/05/23 0423)  SpO2: 95 % (02/05/23 0423)   Vital Signs (24h Range):  Temp:  [97 °F (36.1 °C)-99.9 °F (37.7 °C)] 98 °F (36.7 °C)  Pulse:  [73-92] 73  Resp:  [14-18] 18  SpO2:  [79 %-97 %] 95 %  BP: ()/(49-60) 102/55     Weight: 62 kg (136 lb 11 oz)  Body mass index is 18.92 kg/m².    Intake/Output Summary (Last 24 hours) at 2/5/2023 0753  Last data filed at 2/5/2023 0630  Gross per 24 hour   Intake 1020 ml   Output 2230 ml   Net -1210 ml        Physical Exam  Constitutional:       General: He is not in acute distress.     Appearance: Normal appearance. He is obese. He is not ill-appearing, toxic-appearing or diaphoretic.   HENT:      Head: Normocephalic and atraumatic.      Nose: Nose normal.      Mouth/Throat:      Mouth: Mucous membranes are moist.      Pharynx: Oropharynx is clear.   Eyes:      General: No scleral icterus.     Extraocular Movements: Extraocular movements intact.      Conjunctiva/sclera: Conjunctivae  normal.      Pupils: Pupils are equal, round, and reactive to light.   Cardiovascular:      Rate and Rhythm: Normal rate and regular rhythm.      Pulses: Normal pulses.      Heart sounds: Normal heart sounds. No murmur heard.  Pulmonary:      Effort: Pulmonary effort is normal. No respiratory distress.      Breath sounds: Normal breath sounds. No stridor. No wheezing, rhonchi or rales.   Chest:      Chest wall: No tenderness.   Abdominal:      General: Abdomen is flat. Bowel sounds are normal. There is no distension.      Palpations: Abdomen is soft.      Tenderness: There is no abdominal tenderness. There is no right CVA tenderness, guarding or rebound.   Musculoskeletal:         General: No swelling, tenderness, deformity or signs of injury. Normal range of motion.      Cervical back: Normal range of motion and neck supple. No rigidity or tenderness.      Right lower leg: No edema.      Left lower leg: No edema.      Comments: Right hip dressing clean and intact   Skin:     General: Skin is warm and dry.      Coloration: Skin is not jaundiced.      Findings: No erythema or rash.   Neurological:      General: No focal deficit present.      Mental Status: He is alert and oriented to person, place, and time. Mental status is at baseline.      Motor: No weakness.   Psychiatric:         Mood and Affect: Mood normal.         Behavior: Behavior normal.         Thought Content: Thought content normal.         Judgment: Judgment normal.       Significant Labs: All pertinent labs within the past 24 hours have been reviewed.  CBC:   Recent Labs   Lab 02/03/23  1321 02/04/23  0825   WBC 11.98 11.17   HGB 10.4* 9.1*   HCT 33.6* 29.0*   * 157       CMP:   Recent Labs   Lab 02/04/23  0825      K 4.0      CO2 24   *   BUN 15   CREATININE 0.8   CALCIUM 7.4*   PROT 4.7*   ALBUMIN 2.5*   BILITOT 1.2*   ALKPHOS 57   AST 30   ALT 20   ANIONGAP 7*         Significant Imaging: I have reviewed all pertinent  imaging results/findings within the past 24 hours.      Assessment/Plan:      * Closed displaced fracture of right femoral neck  -Orthopedic surgery consulted and plan to take patient to the OR tomorrow. Pt. NPO at midnight for surgical repair of hip fracture  -Pain control as per Hip Fracture Pathway with multimodal pain regimen with scheduled Tylenol and Lyrica 75 mg po nightly. Robaxin, oxycodone PRN. IV morphine PRN for pain not controlled by PO medications  -DVT prophylaxis pre-op with TEDs/SCDs.   -Check Vitamin D level to assess for Vitamin D deficiency due to concern for osteoporotic fracture.   -Plan to monitor daily electrolytes and H/H post-op.   -Start Lovenox 40 mg subcutaneous daily post-op after surgery for DVT prophylaxis and will need for a total of 28 days after hip fracture surgery  -Consult PT/OT post-op for gait training and strengthening and restoration of ADLs.   -Consult  and case management to assist with discharge planning for this patient after surgery.      Preoperative Cardiac evaluation  Cardiovascular Risk Assessment:  Non-emergent surgery.  No active cardiac problems (such as unstable angina, decompensated heart failure, significant uncontrolled arrhythmias or severe valvular disease).  Intermediate risk surgery.  Functional Status: He IS NOT able to climb a flight of stairs (> 4 METS) with no CP or SOB  His revised cardiac risk index is 0.     1 pt Each: Ischemic Heart Disease, Cerebrovascular Disease,                     CHF, DM, Creatinine > 2           Other Issues: Chronic hypoxic respiratory failure, no acute isses. Ok to proceed with surgery without further cardiac testing or medical optimization    2/3 - to OR today  2/5- stable, MRDC, may be able to go home w outpt PT vs SNF.         Chronic hypoxemic respiratory failure  -Pt. With chronic COPD on 2L NC at home Currently stable, CXR without any acute process  -Duo-nebs PRN, continue home ICS/LAMA monitor with  intermitent pulse oximetry  2/5- oxygen 2 liters , stable    Age-related osteoporosis with current pathological fracture  See above  F/u [facility]        VTE Risk Mitigation (From admission, onward)         Ordered     apixaban tablet 2.5 mg  2 times daily         02/03/23 1245     Place sequential compression device  Until discontinued         02/03/23 0748     Place sequential compression device  Until discontinued         02/02/23 1937                Discharge Planning   GABRIELA: 2/6/2023     Code Status: Full Code   Is the patient medically ready for discharge?: Yes    Reason for patient still in hospital (select all that apply): Patient trending condition  Discharge Plan A: Home with family          Alexa Ibarra MD  Senior Hospitalist  Department of Hospital Medicine  [facility]  [id number], [phone]      [facility] - Surgery

## 2023-02-05 NOTE — PLAN OF CARE
Evaluation completed, plan of care established.    Problem: Occupational Therapy  Goal: Occupational Therapy Goal  Description: Goals to be met by: 3/4/23     Patient will increase functional independence with ADLs by performing:    UE Dressing with Set-up Assistance.  LE Dressing with Set-up Assistance.  Grooming while standing at sink with Set-up Assistance.  Toileting from toilet with Modified [place] for hygiene and clothing management.   Supine to sit with Modified [place].  Step transfer with Modified [place]  Toilet transfer to toilet with Modified [place].    Outcome: Ongoing, Progressing

## 2023-02-05 NOTE — PT/OT/SLP PROGRESS
"Physical Therapy Treatment  Co-treat with OT to accommodate pt activity tolerance and need for skilled hands for safe intervention to progress pt mobility.    Patient Name:  Agustin Hernandes   MRN:  [id number]    Recommendations:     Discharge Recommendations: home health PT  Discharge Equipment Recommendations: walker, rolling  Barriers to discharge: None    Assessment:     Agustin Hernandes is a 82 y.o. male admitted with a medical diagnosis of Closed displaced fracture of right femoral neck.  He presents with the following impairments/functional limitations: weakness, impaired endurance, impaired self care skills, impaired functional mobility, gait instability, impaired balance, decreased lower extremity function, impaired cardiopulmonary response to activity, orthopedic precautions Patient found supine with family present at bedside. Patient requesting to utilize family member for translation. Patient progressing in gait tolerance and out of bed activity. Able to participate in curb training this date to improve safety for preporation for safe return home. Patient family requesting for stair training to better access shower at home for ADLs. Therapy team notified of request to prepare stair training at next visit. Deferred this date secondary to activity tolerance.    Rehab Prognosis: Good; patient would benefit from acute skilled PT services to address these deficits and reach maximum level of function.    Recent Surgery: Procedure(s) (LRB):  ARTHROPLASTY, HIP, TOTAL, ANTERIOR APPROACH (Right) 2 Days Post-Op    Plan:     During this hospitalization, patient to be seen daily to address the identified rehab impairments via gait training, therapeutic activities, therapeutic exercises, neuromuscular re-education and progress toward the following goals:    Plan of Care Expires:  03/06/23    Subjective     Chief Complaint: none reported  Patient/Family Comments/goals: "I dont want to wear the oxygen" (via " ")  Pain/Comfort:  Pain Rating 1: 0/10  Pain Rating Post-Intervention 1: 0/10      Objective:     Communicated with nurse prior to session.  Patient found HOB elevated with oxygen, telemetry upon PT entry to room.     General Precautions: Standard, fall  Orthopedic Precautions: RLE weight bearing as tolerated (RLE no extreme ROM)  Braces: N/A  Respiratory Status: Nasal cannula, flow 2 L/min     Functional Mobility:    Bed Mobility:     Supine to Sit: minimum assistance  Sit to Supine: minimum assistance  Transfers:     Sit to Stand:  contact guard assistance with rolling walker  Toilet Transfer: minimum assistance with  rolling walker  using  Step Transfer  Gait: ~150 in hallway over level surfaces with RW and CGA  Decreased step length on left  Step through with occasional step to gait pattern   Verbal cues to improve erect posture and promote visual scanning of environment for safety awareness.   Stairs:  Pt ascended/descended 6" curb step with Rolling Walker with no handrails with Contact Guard Assistance.   Verbal cues provided for proper ascending/descending technique for LE advancement to maintain WB precautions.      AM-PAC 6 CLICK MOBILITY  Turning over in bed (including adjusting bedclothes, sheets and blankets)?: 3  Sitting down on and standing up from a chair with arms (e.g., wheelchair, bedside commode, etc.): 3  Moving from lying on back to sitting on the side of the bed?: 4  Moving to and from a bed to a chair (including a wheelchair)?: 3  Need to walk in hospital room?: 3  Climbing 3-5 steps with a railing?: 3  Basic Mobility Total Score: 19       Treatment & Education:  Reviewed with family safe bed mobility techniques if assistance is required at home to ensure proper body mechanics for caregivers.     Family asking about possible DC date. Redirected questions regarding DC to nursing and nurse notified of questions for further discussion with healthcare team and family.    Educated patient " on PTA role in established POC  Educated patient on using call bell to request assistance with functional mobility  All questions within PTA scope of practice answered.       Patient left supine with all lines intact, call button in reach, nurse notified, and family present..    GOALS:   Multidisciplinary Problems       Physical Therapy Goals          Problem: Physical Therapy    Goal Priority Disciplines Outcome Goal Variances Interventions   Physical Therapy Goal     PT, PT/OT Ongoing, Progressing     Description: Goals to be met by: 23     Patient will increase functional independence with mobility by performin. Supine to sit with Modified [place] - Not met  2. Sit to stand transfer with Supervision with RW - Not met  3. Gait  x 200 feet with Stand-by Assistance using Rolling Walker - Not met  4. Ascend/Descend 6 inch curb step with Contact Guard Assistance using Rolling Walker - Not met  5. Pt will verbalize RLE orthopedic precautions without cueing (RLE WBAT, no extremes of motion) - Notm et                         Time Tracking:     PT Received On: 23  PT Start Time: 1452     PT Stop Time: 1514  PT Total Time (min): 22 min     Billable Minutes: Gait Training 15    Treatment Type: Treatment  PT/PTA: PTA     PTA Visit Number: 1     2023

## 2023-02-05 NOTE — PLAN OF CARE
[facility]      HOME HEALTH ORDERS  FACE TO FACE ENCOUNTER    Patient Name: Agustin Hernandes  YOB: 1940    PCP: Susy Oneil MD   PCP Address: [address] [address]  PCP Phone Number: [phone]  PCP Fax: [phone]    Encounter Date: 2/2/23    Admit to Home Health    Diagnoses:  Active Hospital Problems    Diagnosis  POA    *Closed displaced fracture of right femoral neck [S72.001A]  Yes     Priority: 1 - High    Chronic hypoxemic respiratory failure [J96.11]  Yes     Priority: 2     Age-related osteoporosis with current pathological fracture [M80.00XA]  Yes     Priority: 5       Resolved Hospital Problems   No resolved problems to display.       Follow Up Appointments:  Future Appointments   Date Time Provider Department Center   2/17/2023  3:15 PM Jj Valderrama NP [facility] [address]   3/17/2023 11:00 AM Galina Sun PA-C [facility]       Allergies:Review of patient's allergies indicates:  No Known Allergies    Medications: Review discharge medications with patient and family and provide education.    Current Facility-Administered Medications   Medication Dose Route Frequency Provider Last Rate Last Admin    acetaminophen tablet 1,000 mg  1,000 mg Oral Q6H Josh Lozano MD   1,000 mg at 02/05/23 1234    apixaban tablet 2.5 mg  2.5 mg Oral BID Rosendo Mack MD   2.5 mg at 02/05/23 0920    bisacodyL suppository 10 mg  10 mg Rectal Daily PRN Josh Lozano MD        celecoxib capsule 100 mg  100 mg Oral Daily Rosendo Mack MD   100 mg at 02/05/23 0920    fluticasone furoate-vilanteroL 100-25 mcg/dose diskus inhaler 1 puff  1 puff Inhalation Daily Josh Lozano MD   1 puff at 02/05/23 0909    melatonin tablet 6 mg  6 mg Oral Nightly PRN Tarun Juarez MD        methocarbamoL tablet 500 mg  500 mg Oral Q6H PRN Josh Lozano MD        methocarbamoL tablet 500 mg  500 mg Oral QID Rosendo Mack MD   500 mg at  02/05/23 1234    ondansetron injection 4 mg  4 mg Intravenous Q12H PRN Josh Lozano MD        polyethylene glycol packet 17 g  17 g Oral Daily Josh Lozano MD   17 g at 02/05/23 0920    senna-docusate 8.6-50 mg per tablet 1 tablet  1 tablet Oral BID Josh Lozano MD   1 tablet at 02/05/23 0920    sodium chloride 0.9% flush 10 mL  10 mL Intravenous PRN Tarun Juarez MD        sodium chloride 0.9% flush 10 mL  10 mL Intravenous PRN Josh Lozano MD         Current Discharge Medication List        START taking these medications    Details   acetaminophen (TYLENOL) 500 MG tablet Take 2 tablets (1,000 mg total) by mouth every 8 (eight) hours as needed for Pain.  Qty: 30 tablet, Refills: 0      apixaban (ELIQUIS) 2.5 mg Tab Take 1 tablet (2.5 mg total) by mouth 2 (two) times daily.  Qty: 60 tablet, Refills: 0      celecoxib (CELEBREX) 100 MG capsule Take 1 capsule (100 mg total) by mouth daily as needed for Pain.  Qty: 30 capsule, Refills: 0      fluticasone-salmeterol diskus inhaler 250-50 mcg Inhale 1 puff into the lungs 2 (two) times daily. Controller  Qty: 60 each, Refills: 11      methocarbamoL (ROBAXIN) 500 MG Tab Take 1 tablet (500 mg total) by mouth every 6 (six) hours as needed (pain 1-4/10 pain scale).  Qty: 30 tablet, Refills: 0           CONTINUE these medications which have NOT CHANGED    Details   ALBUTEROL INHL Inhale into the lungs.      budesonide (PULMICORT FLEXHALER) 90 mcg/actuation AePB Inhale 2 puffs into the lungs. Controller      umeclidinium-vilanteroL (ANORO ELLIPTA) 62.5-25 mcg/actuation DsDv Inhale into the lungs. Controller               I have seen and examined this patient within the last 30 days. My clinical findings that support the need for the home health skilled services and home bound status are the following:no   Weakness/numbness causing balance and gait disturbance due to Fracture making it taxing to leave home.  Requiring assistive device to leave home due to  unsteady gait caused by  Fracture.     Diet:   regular diet      Referrals/ Consults  Physical Therapy to evaluate and treat. Evaluate for home safety and equipment needs; Establish/upgrade home exercise program. Perform / instruct on therapeutic exercises, gait training, transfer training, and Range of Motion.  Occupational Therapy to evaluate and treat. Evaluate home environment for safety and equipment needs. Perform/Instruct on transfers, ADL training, ROM, and therapeutic exercises.    Activities:   activity as tolerated and ambulate in house with assistance    Nursing:   Agency to admit patient within 24 hours of hospital discharge unless specified on physician order or at patient request    SN to complete comprehensive assessment including routine vital signs. Instruct on disease process and s/s of complications to report to MD. Review/verify medication list sent home with the patient at time of discharge  and instruct patient/caregiver as needed. Frequency may be adjusted depending on start of care date.     Skilled nurse to perform up to 3 visits PRN for symptoms related to diagnosis    Notify MD if SBP > 160 or < 90; DBP > 90 or < 50; HR > 120 or < 50; Temp > 101; O2 < 88%; Other:      Ok to schedule additional visits based on staff availability and patient request on consecutive days within the home health episode.    OXygen 2 liters per NC    When multiple disciplines ordered:    Start of Care occurs on Sunday - Wednesday schedule remaining discipline evaluations as ordered on separate consecutive days following the start of care.    Thursday SOC -schedule subsequent evaluations Friday and Monday the following week.     Friday - Saturday SOC - schedule subsequent discipline evaluations on consecutive days starting Monday of the following week.    For all post-discharge communication and subsequent orders please contact patient's primary care physician.   Miscellaneous       Home Health Aide:  Physical  Therapy Three times weekly, Occupational Therapy Three times weekly, and Home Health Aide Three times weekly      I certify that this patient is confined to his home and needs intermittent skilled nursing care, physical therapy, and occupational therapy.           Never smoker

## 2023-02-06 VITALS
HEART RATE: 74 BPM | TEMPERATURE: 98 F | HEIGHT: 71 IN | BODY MASS INDEX: 19.14 KG/M2 | SYSTOLIC BLOOD PRESSURE: 94 MMHG | RESPIRATION RATE: 18 BRPM | WEIGHT: 136.69 LBS | DIASTOLIC BLOOD PRESSURE: 50 MMHG | OXYGEN SATURATION: 90 %

## 2023-02-06 LAB
BLD PROD TYP BPU: NORMAL
BLOOD UNIT EXPIRATION DATE: NORMAL
BLOOD UNIT TYPE CODE: 5100
BLOOD UNIT TYPE: NORMAL
CODING SYSTEM: NORMAL
DISPENSE STATUS: NORMAL
NUM UNITS TRANS PACKED RBC: NORMAL

## 2023-02-06 PROCEDURE — 97110 THERAPEUTIC EXERCISES: CPT | Mod: CQ

## 2023-02-06 PROCEDURE — 99239 PR HOSPITAL DISCHARGE DAY,>30 MIN: ICD-10-PCS | Mod: ,,, | Performed by: HOSPITALIST

## 2023-02-06 PROCEDURE — 94640 AIRWAY INHALATION TREATMENT: CPT

## 2023-02-06 PROCEDURE — 25000003 PHARM REV CODE 250: Performed by: HOSPITALIST

## 2023-02-06 PROCEDURE — 97116 GAIT TRAINING THERAPY: CPT | Mod: CQ

## 2023-02-06 PROCEDURE — 97530 THERAPEUTIC ACTIVITIES: CPT | Mod: CQ

## 2023-02-06 PROCEDURE — 99239 HOSP IP/OBS DSCHRG MGMT >30: CPT | Mod: ,,, | Performed by: HOSPITALIST

## 2023-02-06 PROCEDURE — 25000003 PHARM REV CODE 250: Performed by: STUDENT IN AN ORGANIZED HEALTH CARE EDUCATION/TRAINING PROGRAM

## 2023-02-06 PROCEDURE — 97530 THERAPEUTIC ACTIVITIES: CPT | Mod: CO

## 2023-02-06 PROCEDURE — 27000221 HC OXYGEN, UP TO 24 HOURS

## 2023-02-06 PROCEDURE — 97535 SELF CARE MNGMENT TRAINING: CPT | Mod: CO

## 2023-02-06 PROCEDURE — 94761 N-INVAS EAR/PLS OXIMETRY MLT: CPT

## 2023-02-06 PROCEDURE — 99900035 HC TECH TIME PER 15 MIN (STAT)

## 2023-02-06 RX ORDER — ACETAMINOPHEN 325 MG/1
650 TABLET ORAL EVERY 6 HOURS PRN
Status: DISCONTINUED | OUTPATIENT
Start: 2023-02-06 | End: 2023-02-06 | Stop reason: HOSPADM

## 2023-02-06 RX ADMIN — CELECOXIB 100 MG: 100 CAPSULE ORAL at 09:02

## 2023-02-06 RX ADMIN — POLYETHYLENE GLYCOL 3350 17 G: 17 POWDER, FOR SOLUTION ORAL at 09:02

## 2023-02-06 RX ADMIN — SENNOSIDES AND DOCUSATE SODIUM 1 TABLET: 50; 8.6 TABLET ORAL at 09:02

## 2023-02-06 RX ADMIN — FLUTICASONE FUROATE AND VILANTEROL TRIFENATATE 1 PUFF: 100; 25 POWDER RESPIRATORY (INHALATION) at 08:02

## 2023-02-06 RX ADMIN — APIXABAN 2.5 MG: 2.5 TABLET, FILM COATED ORAL at 09:02

## 2023-02-06 RX ADMIN — METHOCARBAMOL 500 MG: 500 TABLET ORAL at 06:02

## 2023-02-06 RX ADMIN — METHOCARBAMOL 500 MG: 500 TABLET ORAL at 09:02

## 2023-02-06 NOTE — PT/OT/SLP PROGRESS
Physical Therapy Treatment    Patient Name:  Agustin Hernandes   MRN:  [id number]    Recommendations:     Discharge Recommendations: home health PT  Discharge Equipment Recommendations: walker, rolling, shower chair  Barriers to discharge: None    Assessment:     Agustin Hernandes is a 82 y.o. male admitted with a medical diagnosis of Closed displaced fracture of right femoral neck.  He presents with the following impairments/functional limitations: weakness, impaired endurance, impaired self care skills, impaired functional mobility, gait instability, impaired balance, decreased lower extremity function, impaired cardiopulmonary response to activity, orthopedic precautions .  Patient  limited today by deconditioning and c/o pain with gait.   Rehab Prognosis: Good; patient would benefit from acute skilled PT services to address these deficits and reach maximum level of function.    Recent Surgery: Procedure(s) (LRB):  ARTHROPLASTY, HIP, TOTAL, ANTERIOR APPROACH (Right) 3 Days Post-Op    Plan:     During this hospitalization, patient to be seen daily to address the identified rehab impairments via gait training, therapeutic activities, therapeutic exercises, neuromuscular re-education and progress toward the following goals:    Plan of Care Expires:  03/06/23    Subjective     Chief Complaint: pain with mobility  Pain/Comfort:  Pain Rating 1:  (unrated)  Location - Side 1: Right  Location - Orientation 1: generalized  Location 1: hip  Pain Addressed 1: Reposition, Distraction, Cessation of Activity      Objective:     Communicated with RN prior to session.  Patient found HOB elevated with telemetry, oxygen upon PT entry to room.     General Precautions: Standard, fall  Orthopedic Precautions:  (RLE weight bearing as tolerated; RLE anterior precautions   RLE no extreme ROM)  Braces: N/A  Respiratory Status: Nasal cannula, flow 1 L/min     Functional Mobility:  Bed Mobility:     Supine to Sit: minimum  "assistance  Transfers:     Sit to Stand:  moderate assistance with rolling walker  Bed to Chair: minimum assistance with  rolling walker  using  Step Transfer  Gait: pt amb with RW 40 ft and 10 ft with RW with [place] with O2 and chair in tow. Patient required increased time for recovery SpO2 high 80% at 2L/min O2 via NC. Heart rate fluctuated 46-75. Patient c/o light headedness requiring prolonged rest break  Stairs:  Pt ascended/descended 4 stair(s) and 6" curb step with Rolling Walker with bilateral handrails with Minimal Assistance.  Vcs for seqeuncing      AM-PAC 6 CLICK MOBILITY  Turning over in bed (including adjusting bedclothes, sheets and blankets)?: 3  Sitting down on and standing up from a chair with arms (e.g., wheelchair, bedside commode, etc.): 3  Moving from lying on back to sitting on the side of the bed?: 4  Moving to and from a bed to a chair (including a wheelchair)?: 3  Need to walk in hospital room?: 3  Climbing 3-5 steps with a railing?: 3  Basic Mobility Total Score: 19       Treatment & Education:  Therapist provided instruction and educated of  patient on progress, safety,d/c,PT POC,   proper body mechanics, energy conservation, and fall prevention strategies during tasks listed above, on the effects of prolonged immobility and the importance of performing OOB activity and exercises to promote healing and reduce recovery time. Anterior hip precautions  Patient  facilitated therex 10 reps in bedside chair  LE AROM AP, QS,GS. Patient required skilled PTA for instruction of exercises and appropriate cues to perform exercises safely, sequencing and appropriately.   Exercises performed to develop and maintain pt's strength, endurance and flexibility.  Updated white board with appropriate PT mobility information for medical team notification     Donned shirt and pants  Call nursing/pct to transfer to chair/use bathroom. Pt stated understanding      Bedside table in front of patient and area set up " for function, convenience, and safety. RN aware of patient's mobility needs and status. Questions/concerns addressed within PTA scope of practice; patient  with no further questions. Time was provided for active listening, discussion of health disposition, and discussion of safe discharge. Pt?verbalized?agreement .  Co-treatment performed with JACI due to patient's complexity and benefit of 2 skilled therapists to facilitate functional and safe occupational performance, accommodate patient's activity tolerance, and maximize patient's participation in therapy.   Patient left up in chair with all lines intact, call button in reach, nsg notified, and family present..    GOALS:   Multidisciplinary Problems       Physical Therapy Goals          Problem: Physical Therapy    Goal Priority Disciplines Outcome Goal Variances Interventions   Physical Therapy Goal     PT, PT/OT Ongoing, Progressing     Description: Goals to be met by: 23     Patient will increase functional independence with mobility by performin. Supine to sit with Modified [place] - Not met  2. Sit to stand transfer with Supervision with RW - Not met  3. Gait  x 200 feet with Stand-by Assistance using Rolling Walker - Not met  4. Ascend/Descend 6 inch curb step with Contact Guard Assistance using Rolling Walker - Not met  5. Pt will verbalize RLE orthopedic precautions without cueing (RLE WBAT, no extremes of motion) - Notm et                         Time Tracking:     PT Received On: 23  PT Start Time: 852     PT Stop Time: 949  PT Total Time (min): 57 min     Billable Minutes: Gait Training 25, Therapeutic Activity 15, and Therapeutic Exercise 15    Treatment Type: Treatment        PTA Visit Number: 2     2023

## 2023-02-06 NOTE — PT/OT/SLP PROGRESS
"Occupational Therapy   Co-Treatment with Physical Therapy    Name: Agustin Hernandes  MRN: [id number]  Admitting Diagnosis:  Closed displaced fracture of right femoral neck  3 Days Post-Op  Procedure(s):  ARTHROPLASTY, HIP, TOTAL, ANTERIOR APPROACH     Recommendations:     Discharge Recommendations: home health OT  Discharge Equipment Recommendations:  walker, rolling, shower chair  Barriers to discharge:  None    Assessment:     Agustin Hernandes is a 82 y.o. male with a medical diagnosis of Closed displaced fracture of right femoral neck.  He presents with the following performance deficits affecting function are weakness, impaired endurance, impaired self care skills, impaired functional mobility, gait instability, impaired balance, decreased lower extremity function, decreased ROM, orthopedic precautions, pain, decreased safety awareness, impaired cardiopulmonary response to activity, decreased coordination. Patient agreeable to OT session and tolerated fair. Patient primarily limited by deconditioning and c/o pain. Patient would benefit from continued OT services to address deficits and progress towards goals. Continue OT POC.    Rehab Prognosis:  Good; patient would benefit from acute skilled OT services to address these deficits and reach maximum level of function.       Plan:     Patient to be seen daily to address the above listed problems via self-care/home management, therapeutic activities, therapeutic exercises, neuromuscular re-education  Plan of Care Expires: 03/04/23  Plan of Care Reviewed with: patient, spouse    Subjective   Patient c/o increased pain today in comparison to yesterday. "I was stronger yesterday."    Pain/Comfort:  Pain Rating 1:  (unrated)  Location - Side 1: Right  Location - Orientation 1: generalized  Location 1: hip  Pain Addressed 1: Reposition, Distraction, Nurse notified, Cessation of Activity  Pain Rating Post-Intervention 1:  (unrated)    Objective:     Communicated with: " RN and OTR prior to session.  Patient found HOB elevated with oxygen, telemetry upon OT entry to room.  A client care conference was completed by the OTR and the WARNER prior to treatment by the WARNER to discuss the patient's POC and current status.  : Varun arrived end half of therapy    General Precautions: Standard, fall    Orthopedic Precautions:RLE weight bearing as tolerated, RLE anterior precautions (RLE no extreme ROM)  Braces: N/A  Respiratory Status: Nasal cannula, flow 1 L/min (increased to 2L/min during therapy)     Occupational Performance:     Bed Mobility:    Patient completed Supine to Sit with minimum assistance     Functional Mobility/Transfers:  Patient completed Sit <> Stand Transfer with moderate assistance  with  rolling walker   Patient completed Bed > Chair Transfer using Step Transfer technique with minimum assistance with rolling walker  Functional Mobility: within room environment using RW with Min(A) with min cues for sequencing and RW management  Patient required increased time for recovery SpO2 high 80s% at 2L/min O2 via NC  Heart rate fluctuated 46-75  Patient c/o light headedness requiring prolonged rest break  RN notified of patient symptoms    Activities of Daily Living:  Grooming: supervision seated EOB for facial hygiene  Bathing: minimum assistance for balance to complete abbrev LB sponge bath standing using RW  Upper Body Dressing: modified independence seated EOB to don x2 t-shirt  Lower Body Dressing: minimum assistance for decreased ROM to thread (R)LE and balance to pull pants over hips standing      AMPAC 6 Click ADL: 20    Treatment & Education:  Education on OT POC, goals, and current progress  Re-educated on orthopedic precautions  ADL re-training as indicated above  Functional mobility/transfer training as indicated above  Addressed all patient questions/concerns within JACI scope of practice.   Co-treatment performed with PTA due to patient's complexity  and benefit of 2 skilled therapists to facilitate functional and safe occupational performance, accommodate patient's activity tolerance, and maximize patient's participation in therapy.     Patient left up in chair with all lines intact, call button in reach, RN notified, and  and family present    GOALS:   Multidisciplinary Problems       Occupational Therapy Goals          Problem: Occupational Therapy    Goal Priority Disciplines Outcome Interventions   Occupational Therapy Goal     OT, PT/OT Ongoing, Progressing    Description: Goals to be met by: 3/4/23     Patient will increase functional independence with ADLs by performing:    UE Dressing with Set-up Assistance.  LE Dressing with Set-up Assistance.  Grooming while standing at sink with Set-up Assistance.  Toileting from toilet with Modified [place] for hygiene and clothing management.   Supine to sit with Modified [place].  Step transfer with Modified [place]  Toilet transfer to toilet with Modified [place].                         Time Tracking:     OT Date of Treatment: 02/06/23  OT Start Time: 0853  OT Stop Time: 0946  OT Total Time (min): 53 min    Billable Minutes:Self Care/Home Management 30  Therapeutic Activity 23    OT/JACI: JACI BEATTY Visit Number: 1    2/6/2023

## 2023-02-06 NOTE — ASSESSMENT & PLAN NOTE
-Orthopedic surgery consulted and plan to take patient to the OR tomorrow. Pt. NPO at midnight for surgical repair of hip fracture  -Pain control as per Hip Fracture Pathway with multimodal pain regimen with scheduled Tylenol and Lyrica 75 mg po nightly. Robaxin, oxycodone PRN. IV morphine PRN for pain not controlled by PO medications  -DVT prophylaxis pre-op with TEDs/SCDs.   -Check Vitamin D level to assess for Vitamin D deficiency due to concern for osteoporotic fracture.   -Plan to monitor daily electrolytes and H/H post-op.   -Start Lovenox 40 mg subcutaneous daily post-op after surgery for DVT prophylaxis and will need for a total of 28 days after hip fracture surgery  -Consult PT/OT post-op for gait training and strengthening and restoration of ADLs.   -Consult  and case management to assist with discharge planning for this patient after surgery.      Preoperative Cardiac evaluation  Cardiovascular Risk Assessment:  Non-emergent surgery.  No active cardiac problems (such as unstable angina, decompensated heart failure, significant uncontrolled arrhythmias or severe valvular disease).  Intermediate risk surgery.  Functional Status: He IS NOT able to climb a flight of stairs (> 4 METS) with no CP or SOB  His revised cardiac risk index is 0.     1 pt Each: Ischemic Heart Disease, Cerebrovascular Disease,                     CHF, DM, Creatinine > 2           Other Issues: Chronic hypoxic respiratory failure, no acute isses. Ok to proceed with surgery without further cardiac testing or medical optimization    2/3 - to OR today  2/5- stable, MRDC, may be able to go home w outpt PT vs SNF.   2/6- dc to home.

## 2023-02-06 NOTE — SUBJECTIVE & OBJECTIVE
"Principal Problem:Closed displaced fracture of right femoral neck    Principal Orthopedic Problem: Same    Interval History: YORDANEON, patient stable, pain controlled. No acute complaints this morning. Dressings CDI. 150ft with PT yesterday. Anticipate dc with home health today    Review of patient's allergies indicates:  No Known Allergies    Current Facility-Administered Medications   Medication    acetaminophen tablet 650 mg    apixaban tablet 2.5 mg    bisacodyL suppository 10 mg    celecoxib capsule 100 mg    fluticasone furoate-vilanteroL 100-25 mcg/dose diskus inhaler 1 puff    melatonin tablet 6 mg    methocarbamoL tablet 500 mg    methocarbamoL tablet 500 mg    ondansetron injection 4 mg    polyethylene glycol packet 17 g    senna-docusate 8.6-50 mg per tablet 1 tablet    sodium chloride 0.9% flush 10 mL    sodium chloride 0.9% flush 10 mL     Objective:     Vital Signs (Most Recent):  Temp: 99.2 °F (37.3 °C) (02/06/23 0420)  Pulse: 73 (02/06/23 0422)  Resp: 18 (02/06/23 0420)  BP: (!) 101/58 (02/06/23 0420)  SpO2: (!) 92 % (02/06/23 0420)   Vital Signs (24h Range):  Temp:  [97.3 °F (36.3 °C)-99.2 °F (37.3 °C)] 99.2 °F (37.3 °C)  Pulse:  [72-97] 73  Resp:  [16-20] 18  SpO2:  [91 %-94 %] 92 %  BP: ()/(50-58) 101/58     Weight: 62 kg (136 lb 11 oz)  Height: 5' 11" (180.3 cm)  Body mass index is 19.06 kg/m².      Intake/Output Summary (Last 24 hours) at 2/6/2023 0712  Last data filed at 2/6/2023 0430  Gross per 24 hour   Intake 768 ml   Output 1250 ml   Net -482 ml         Ortho/SPM Exam  AAOx4  NAD  Reg rate  No increased WOB    RLE:  Dressing c/d/i  SILT T/SP/DP/Montoya/Sa  Motor intact T/SP/DP  WWP extremities  FCDs in place and functioning    Significant Labs:   Recent Lab Results       None          All pertinent labs within the past 24 hours have been reviewed.    Significant Imaging: I have reviewed and interpreted all pertinent imaging results/findings.  "

## 2023-02-06 NOTE — DISCHARGE SUMMARY
[facility] - Surgery  [facility] Medicine  Discharge Summary      Patient Name: Agustin Hernandes  MRN: [id number]  KRISTINA: [id number]  Patient Class: IP- Inpatient  Admission Date: 2/2/2023  Hospital Length of Stay: 4 days  Discharge Date and Time: No discharge date for patient encounter.  Attending Physician: Alexa Ibarra MD   Discharging Provider: Alexa Ibarra MD  Primary Care Provider: Susy Oneil MD  [facility] Medicine Team: [facility] MED  Alexa Ibarra MD  Primary Care Team: [facility] MED     HPI:   83 yo M with PMHx of COPD on 2L home oxygen who presents for evaluation of R hip fracture noted on outpatient imaging. Pt. Son at bedside assists with history. Pt. Currently visiting his son, lives in [facility]. Yesterday evening, the patient tripped trying to get by a dog gate in his son's house and fell backwards onto his buttocks. He noted immeidate pain and difficulty bearing weight, but he was able to walk with asssitance, and his pain improved with ice and ibuprofen. The patient's pain continued to worsen today, however, particularly on his R hip, so his son took him for further evaluation. Outpatient X-rays were positive for    Acute mildly displaced right femoral neck fracture and patient was intructed to come to ED. At baseline, pt. Is independent and ambulatory. No reported SOB, chest pain, lightheadedness, or LOC that contributed to patient's fall yesterday.    In the ED, ortho was consulted. They are planning on surgery.  NPO since midnight  - Pt marked, booked, and consented for surgery  -DVT PPx: Hold anticoagulation  -Abx: Preop abx ordered  -Hgb 13.6, 1u pRBC on hold  - owens in place, UA with leukocytes, given 1g of rocephin  - Iv: ordered for contralateral arm      Procedure(s) (LRB):  ARTHROPLASTY, HIP, TOTAL, ANTERIOR APPROACH (Right)      Hospital Course:   2/3 - BP 99/61 VSS. Saw pt in post op setting at 5 pm. Doing well. Wife at bedside they need a . BP 97/55 VSS,  SpO2 (!)  92% on  2 liters nc. (Home dose)  Ate dinner. Looks comfortable. PNC in place.     2/4- BP 90/51 VSS. s/p R DHEERAJ on 2/3/23. Pt eating. [place]ian speaking, Needs .  PT/OT: WBAT RLE, anterior hip precautions.  DVT PPx: Eliquis BID, FCDs at all times when not ambulating.  Abx: postop Ancef. Oshea: remove POD1  Dispo: pending PT and pain control . Lab stable    2/5- completed ancef, walking w PT down halls. /55 VSS. Speaking in [place]ian through . And expressed understanding.     2/6- Pt desires to go home. PT recommending HH and rolling walker. BP 99/54 walking down halls  will dc to home.        Goals of Care Treatment Preferences:  Code Status: Full Code      Consults:   Consults (From admission, onward)        Status Ordering Provider     Inpatient consult to Social Work/Case Management  Once        Provider:  (Not yet assigned)    Acknowledged LIDIA CHOWDHURY     Inpatient consult to Orthopedic Surgery  Once        Provider:  (Not yet assigned)    Completed FRITZ ADAMS          * Closed displaced fracture of right femoral neck  -Orthopedic surgery consulted and plan to take patient to the OR tomorrow. Pt. NPO at midnight for surgical repair of hip fracture  -Pain control as per Hip Fracture Pathway with multimodal pain regimen with scheduled Tylenol and Lyrica 75 mg po nightly. Robaxin, oxycodone PRN. IV morphine PRN for pain not controlled by PO medications  -DVT prophylaxis pre-op with TEDs/SCDs.   -Check Vitamin D level to assess for Vitamin D deficiency due to concern for osteoporotic fracture.   -Plan to monitor daily electrolytes and H/H post-op.   -Start Lovenox 40 mg subcutaneous daily post-op after surgery for DVT prophylaxis and will need for a total of 28 days after hip fracture surgery  -Consult PT/OT post-op for gait training and strengthening and restoration of ADLs.   -Consult  and case management to assist with discharge planning for this patient after  "surgery.      Preoperative Cardiac evaluation  Cardiovascular Risk Assessment:  Non-emergent surgery.  No active cardiac problems (such as unstable angina, decompensated heart failure, significant uncontrolled arrhythmias or severe valvular disease).  Intermediate risk surgery.  Functional Status: He IS NOT able to climb a flight of stairs (> 4 METS) with no CP or SOB  His revised cardiac risk index is 0.     1 pt Each: Ischemic Heart Disease, Cerebrovascular Disease,                     CHF, DM, Creatinine > 2           Other Issues: Chronic hypoxic respiratory failure, no acute isses. Ok to proceed with surgery without further cardiac testing or medical optimization    2/3 - to OR today  2/5- stable, MRDC, may be able to go home w outpt PT vs SNF.   2/6- dc to home.         Chronic hypoxemic respiratory failure  -Pt. With chronic COPD on 2L NC at home Currently stable, CXR without any acute process  -Duo-nebs PRN, continue home ICS/LAMA monitor with intermitent pulse oximetry  2/5- oxygen 2 liters , stable    Age-related osteoporosis with current pathological fracture  See above  F/u [facility]        Final Active Diagnoses:    Diagnosis Date Noted POA    PRINCIPAL PROBLEM:  Closed displaced fracture of right femoral neck [S72.001A] 02/02/2023 Yes    Chronic hypoxemic respiratory failure [J96.11] 02/02/2023 Yes    Age-related osteoporosis with current pathological fracture [M80.00XA] 02/02/2023 Yes      Problems Resolved During this Admission:       Discharged Condition: good    Disposition: Home or Self Care    Follow Up:    Patient Instructions:      WALKER FOR HOME USE     Order Specific Question Answer Comments   Type of Walker: Adult (5'4"-6'6")    With wheels? Yes    Height: 5' 11" (1.803 m)    Weight: 62 kg (136 lb 11 oz)    Length of need (1-99 months): 99    Does patient have medical equipment at home? none    Please check all that apply: Patient's condition impairs ambulation.      Ambulatory " referral/consult to Orthopedics Fracture Care   Standing Status: Future   Referral Priority: Routine Referral Type: Consultation   Requested Specialty: Orthopedic Surgery   Number of Visits Requested: 1     Ambulatory referral/consult to Internal Medicine   Standing Status: Future   Referral Priority: Routine Referral Type: Consultation   Referral Reason: Specialty Services Required   Requested Specialty: Internal Medicine   Number of Visits Requested: 1     Ambulatory referral/consult to Physical/Occupational Therapy   Standing Status: Future   Referral Priority: Routine Referral Type: Physical Medicine   Referral Reason: Specialty Services Required   Number of Visits Requested: 1       Significant Diagnostic Studies: Labs:   CMP   Recent Labs   Lab 02/04/23  0825      K 4.0      CO2 24   *   BUN 15   CREATININE 0.8   CALCIUM 7.4*   PROT 4.7*   ALBUMIN 2.5*   BILITOT 1.2*   ALKPHOS 57   AST 30   ALT 20   ANIONGAP 7*    and CBC   Recent Labs   Lab 02/04/23  0825   WBC 11.17   HGB 9.1*   HCT 29.0*          Pending Diagnostic Studies:     None         Medications:  Reconciled Home Medications:      Medication List      START taking these medications    acetaminophen 500 MG tablet  Commonly known as: TYLENOL  Take 2 tablets (1,000 mg total) by mouth every 8 (eight) hours as needed for Pain.     apixaban 2.5 mg Tab  Commonly known as: ELIQUIS  [place] chong tableta (2.5 mg en total) por vía oral 2 veces al día.  (Take 1 tablet (2.5 mg total) by mouth 2 (two) times daily.)     celecoxib 100 MG capsule  Commonly known as: CeleBREX  Take 1 capsule (100 mg total) by mouth daily as needed for Pain.     fluticasone-salmeterol 250-50 mcg/dose 250-50 mcg/dose diskus inhaler  Commonly known as: ADVAIR  Inhale 1 puff into the lungs 2 (two) times daily. Controller     methocarbamoL 500 MG Tab  Commonly known as: ROBAXIN  Take 1 tablet (500 mg total) by mouth every 6 (six) hours as needed (pain 1-4/10 pain  scale).        CONTINUE taking these medications    ALBUTEROL INHL  Inhale into the lungs.     PULMICORT FLEXHALER 90 mcg/actuation Aepb  Generic drug: budesonide  Inhale 2 puffs into the lungs. Controller     umeclidinium-vilanteroL 62.5-25 mcg/actuation Dsdv  Commonly known as: ANORO ELLIPTA  Inhale into the lungs. Controller            Indwelling Lines/Drains at time of discharge:   Lines/Drains/Airways     None                 Time spent on the discharge of patient: 35 minutes         Alexa Ibarra MD  Department of [facility] Medicine  [facility] - Surgery

## 2023-02-06 NOTE — ASSESSMENT & PLAN NOTE
Agustin Hernandes is a 82 y.o. male with right varus impacted femoral neck fracture, closed, NVI. They are not on anticoagulation at home. They did not require ambulatory assistive devices prior to this injury. Now s/p R anterior DHEERAJ on 2/3/23.    Surgical dressing C/D/I  Pain control: multimodal  PT/OT: WBAT RLE, anterior hip precautions  DVT PPx: Eliquis BID, FCDs at all times when not ambulating  Abx: postop Ancef complete  Drain: none  Oshea: removed    Dispo: anticipate dc with home health

## 2023-02-06 NOTE — PROGRESS NOTES
[facility] - Surgery  Orthopedics  Progress Note    Attg Note:  Patient seen and examined.  I agree with the resident's assessment and plan.  He is done very well with physical occupational therapy.  He is going home with the family.  I will look into resources for him for physical/occupational therapy.  He plans on staying in the area for several weeks and then likely doing some more rehabilitation with his family member in [place].    Tam Nunez MD      Patient Name: Agustin Hernandes  MRN: [id number]  Admission Date: 2/2/2023  Hospital Length of Stay: 4 days  Attending Provider: Alexa Ibarra MD  Primary Care Provider: Susy Oneil MD  Follow-up For: Procedure(s) (LRB):  ARTHROPLASTY, HIP, TOTAL, ANTERIOR APPROACH (Right)    Post-Operative Day: 3 Days Post-Op  Subjective:     Principal Problem:Closed displaced fracture of right femoral neck    Principal Orthopedic Problem: Same    Interval History: NAEON, patient stable, pain controlled. No acute complaints this morning. Dressings CDI. 150ft with PT yesterday. Anticipate dc with home health today    Review of patient's allergies indicates:  No Known Allergies    Current Facility-Administered Medications   Medication    acetaminophen tablet 650 mg    apixaban tablet 2.5 mg    bisacodyL suppository 10 mg    celecoxib capsule 100 mg    fluticasone furoate-vilanteroL 100-25 mcg/dose diskus inhaler 1 puff    melatonin tablet 6 mg    methocarbamoL tablet 500 mg    methocarbamoL tablet 500 mg    ondansetron injection 4 mg    polyethylene glycol packet 17 g    senna-docusate 8.6-50 mg per tablet 1 tablet    sodium chloride 0.9% flush 10 mL    sodium chloride 0.9% flush 10 mL     Objective:     Vital Signs (Most Recent):  Temp: 99.2 °F (37.3 °C) (02/06/23 0420)  Pulse: 73 (02/06/23 0422)  Resp: 18 (02/06/23 0420)  BP: (!) 101/58 (02/06/23 0420)  SpO2: (!) 92 % (02/06/23 0420)   Vital Signs (24h Range):  Temp:  [97.3 °F (36.3 °C)-99.2 °F (37.3 °C)] 99.2 °F (37.3  "°C)  Pulse:  [72-97] 73  Resp:  [16-20] 18  SpO2:  [91 %-94 %] 92 %  BP: ()/(50-58) 101/58     Weight: 62 kg (136 lb 11 oz)  Height: 5' 11" (180.3 cm)  Body mass index is 19.06 kg/m².      Intake/Output Summary (Last 24 hours) at 2/6/2023 0712  Last data filed at 2/6/2023 0430  Gross per 24 hour   Intake 768 ml   Output 1250 ml   Net -482 ml         Ortho/SPM Exam  AAOx4  NAD  Reg rate  No increased WOB    RLE:  Dressing c/d/i  SILT T/SP/DP/Montoya/Sa  Motor intact T/SP/DP  WWP extremities  FCDs in place and functioning    Significant Labs:   Recent Lab Results       None          All pertinent labs within the past 24 hours have been reviewed.    Significant Imaging: I have reviewed and interpreted all pertinent imaging results/findings.    Assessment/Plan:     * Closed displaced fracture of right femoral neck  Agustin Hernandes is a 82 y.o. male with right varus impacted femoral neck fracture, closed, NVI. They are not on anticoagulation at home. They did not require ambulatory assistive devices prior to this injury. Now s/p R anterior DHEERAJ on 2/3/23.    Surgical dressing C/D/I  Pain control: multimodal  PT/OT: WBAT RLE, anterior hip precautions  DVT PPx: Eliquis BID, FCDs at all times when not ambulating  Abx: postop Ancef complete  Drain: none  Oshea: removed    Dispo: anticipate dc with home health          Last Baptiste MD  Orthopedics  [facility] - Surgery  "

## 2023-02-08 ENCOUNTER — CLINICAL SUPPORT (OUTPATIENT)
Dept: REHABILITATION | Facility: HOSPITAL | Age: 83
End: 2023-02-08
Attending: HOSPITALIST
Payer: MEDICAID

## 2023-02-08 DIAGNOSIS — S72.001A CLOSED FRACTURE OF NECK OF RIGHT FEMUR, INITIAL ENCOUNTER: Primary | ICD-10-CM

## 2023-02-08 PROCEDURE — 97110 THERAPEUTIC EXERCISES: CPT | Mod: PO

## 2023-02-08 NOTE — PROGRESS NOTES
[facility]   Physical Therapy Initial Evaluation     Date: 2/8/2023   Name: Agustin Harper [facility] Number: [id number]    Therapy Diagnosis:   Encounter Diagnosis   Name Primary?    Closed fracture of neck of right femur, initial encounter      Physician: Alexa Ibarra MD    Physician Orders: PT Eval and Treat  Medical Diagnosis from Referral: S72.001A (ICD-10-CM) - Closed fracture of neck of right femur, initial encounter  Evaluation Date: 2/8/2023  Authorization Period Expiration: 2/5/2024  Plan of Care Expiration: 3/24/2023  Progress Note Due: 3/8/2023  Visit # / Visits authorized: 1/1   FOTO: 0/3    Precautions: Standard, Fall, and Hip Precautions      Time In: 9:10 am  Time Out: 10:00 am  Total Appointment Time (timed & untimed codes): 50 minutes      SUBJECTIVE     Date of onset: 02/02/2023    History of current condition - Agustin reports that he tripped and fell when walking while visiting his son's home. He presented to the ED the following day and underwent x-ray imaging which demonstrated right femoral neck fracture for which he underwent right total hip arthroplasty, anterior approach. The patient stayed in the hospital for four (4) days following the surgery before being discharged to home with family care and orders for outpatient physical therapy. His current primary complaint is of pain with hip internal/external rotation, and right lower extremity weakness.    Falls: 1 fall (aforementioned)    Imaging, X-ray: demonstrated right femoral neck fracture leading to right total hip arthroplasty    Prior Therapy: None  Social History: Primarily lives with his wife at home in [place] a farm, and stays with his son while visiting the [place] in a 2-story home. Patient's bedroom is on first floor, though the shower is on second floor. Single threshold step to enter home.  Occupation: Farmer.  Prior Level of Function: 2-weeks ago, no assistive device for  ambulation, independent with all ADLs/IADLs.  Current Level of Function: Ambulates household distances using 2WW, dependent for community distance using wheelchair.    Pain:  Current 0/10, worst 4/10, best 0/10   Location: Right Hip  Description: Sore  Aggravating Factors: Getting out of bed/chair, sit to stands, hip internal/external rotation  Easing Factors: Rest in non weight bearing postures, walking/moving    Patients Goals: To walk straight and return to as close to his PLOF as possible.     Medical History:   No medical record on file:  Self Reported:  COPD, occasional use of supplemental O2 at high elevation    Surgical History:   Agustin Hernandes  has a past surgical history that includes Arthroplasty of hip by anterior approach (Right, 2/3/2023).  Right Knee Arthroscopy with Meniscectomy (~2017)    Medications:   Agustin has a current medication list which includes the following prescription(s): acetaminophen, albuterol, apixaban, pulmicort flexhaler, celecoxib, fluticasone-salmeterol 250-50 mcg/dose, methocarbamol, and umeclidinium-vilanterol.    Allergies:   Review of patient's allergies indicates:  No Known Allergies    OBJECTIVE     Observation: Presents to clinic in wheelchair with multiple family members assisting with donning/doffing footwear, ambulates with 2WW at inappropriate height, flexed forward posture with increased weight bearing through hands, step to pattern leading with right lower extremity, decreased stance time right lower extremity, and decreased foot clearance bilaterally.    Functional Mobility:  Sit<>Stands: CGA with verbal cues and demonstration  Wheelchair<>Bed: CGA with verbal cues and demonstration    Right Lower Extremity ROM   AROM PROM   Hip Flexion 10 90   Hip Abduction 10 20     Lower Extremity Strength   Right Lower Extremity Left Lower Extremity   Hip Flexion 3-/5 4-/5   Hip Abduction 3-/5 4-/5   Hip Extension 3-/5 4+/5   Hip External Rotation 3-/5 4-/5   Hip Internal  "Rotation 3-/5 4-/5   Knee Extension 3-/5 5/5   Knee Flexion 3-/5 4+/5     Functional Testing:  TUG Test: 52.30 seconds with 2WW  30 Second Sit to Stand - Modified with Bilateral Upper Extremity Assist: 4 repetitions wheelchair<>2WW    Limitation/Restriction for FOTO Lower Extremity Survey    Therapist reviewed FOTO scores for Agustin Hernandes on 2/8/2023.   FOTO documents entered into [employer] - see Media section.    Limitation Score: To be assessed next visit.            TREATMENT     Total Treatment time (time-based codes) separate from Evaluation: 30 minutes      Agustin received the treatments listed below:      therapeutic exercises to develop strength, endurance, ROM, and flexibility for 15 minutes including:  PROM - Hip Flexion to 90 degrees, Hip Abduction to tolerance: 1x20 each motion  Quad Sets: 1x20 with 3" hold  Short Arc Quads: 1x20 AAROM  Hooklying Clam Shells: 1x20  Seated Marches: 1x20 AAROM  Seated Hip Adduction Pillow Squeeze: 1x20 with 3" hold  Sit<>Stands: 2x5 with CGA    manual therapy techniques: Myofacial release and Soft tissue Mobilization were applied to the: right lower extremity for 0 minutes, including:  None    neuromuscular re-education activities to improve: Balance, Proprioception, and Posture for 0 minutes. The following activities were included:  None    therapeutic activities to improve functional performance for 15  minutes, including:  - Patient education (see below)  - Assistive device training with 2WW    PATIENT EDUCATION AND HOME EXERCISES     Education provided:   - Importance of adherence to post-operative hip precautions  - Initial home exercise program  - Proper fitting of assistive device (2WW height) and obtaining appropriate DME  - Ice for pain control    Written Home Exercises Provided: yes. Exercises were reviewed and Agustin was able to demonstrate them prior to the end of the session.  Agustin demonstrated fair  understanding of the education provided. See EMR under " Patient Instructions for exercises provided during therapy sessions.    ASSESSMENT     Agustin is a 82 y.o. male referred to outpatient Physical Therapy with a medical diagnosis of  S72.001A (ICD-10-CM) - Closed fracture of neck of right femur, initial encounter    Patient presents with gait deviations, decreased bilateral hip/knee strength (right > left), decreased right hip AROM/PROM, post-operative pain in right hip, impaired activity tolerance, and impaired balance.    Patient prognosis is Fair.   Patient will benefit from skilled outpatient Physical Therapy to address the deficits stated above and in the chart below, provide patient /family education, and to maximize patientt's level of independence.     Plan of care discussed with patient: Yes  Patient's spiritual, cultural and educational needs considered and patient is agreeable to the plan of care and goals as stated below:     Anticipated Barriers for therapy: language barrier, compliance, scheduling, transportation dependence    Medical Necessity is demonstrated by the following  History  Co-morbidities and personal factors that may impact the plan of care Co-morbidities:   COPD, occasional use of supplemental O2 at high elevation    Personal Factors:   age  lifestyle     low   Examination  Body Structures and Functions, activity limitations and participation restrictions that may impact the plan of care Body Regions:   lower extremities    Body Systems:    ROM  strength  balance  gait  transfers    Participation Restrictions:   Decreased endurance with history of COPD    Activity limitations:   Learning and applying knowledge  no deficits    General Tasks and Commands  no deficits    Communication  communicating with/receiving spoken language    Mobility  lifting and carrying objects  walking  moving around using equipment (WC)  driving (bike, car, motorcycle)    Self care  washing oneself (bathing, drying, washing hands)  toileting  dressing    Domestic  Life  doing house work (cleaning house, washing dishes, laundry)    Interactions/Relationships  no deficits    Life Areas  no deficits    Community and Social Life  community life  recreation and leisure         moderate   Clinical Presentation stable and uncomplicated low   Decision Making/ Complexity Score: low     Goals:  Short Term Goals: 4 weeks   Patient will be independent with initial HEP for right lower extremity flexibility and functional strength to improve therapy outcomes.  Patient will improve right hip strength to >/= 3/5 to improve standing tolerance.  Patient will improve TUG test time from 52.30 seconds with 2WW to </= 35 seconds with 2WW to demonstrate decreased fall risk with ambulation.  Patient will improve modified 30 second sit to stand score from 4 repetitions to >/= 8 repetitions to demonstrate improved functional lower extremity strength.    Long Term Goals: 8 weeks   Patient will be independent with final HEP for right lower extremity strength, flexibility, and functional mobility to encourage long term functional independence.  Patient will improve right hip strength to >/= 4-/5 in all tested planes to improve his ability to perform household ADLs and navigate stairs.  Patient will demonstrate independence with sit<>stands to improve his ability to navigate his home.  Patient will improve TUG test time from 52.30 seconds to </= 20 seconds to demonstrate decreased fall risk with functional mobility.    PLAN   Plan of care Certification: 2/8/2023 to 3/24/2023.    Outpatient Physical Therapy 3 times weekly for 6 weeks to include the following interventions: Electrical Stimulation  , Gait Training, Manual Therapy, Moist Heat/ Ice, Neuromuscular Re-ed, Patient Education, Therapeutic Activities, Therapeutic Exercise, and Ultrasound.     Colton Lewis, PT, DPT    I CERTIFY THE NEED FOR THESE SERVICES FURNISHED UNDER THIS PLAN OF TREATMENT AND WHILE UNDER MY CARE   Physician's comments:      Physician's Signature: ___________________________________________________

## 2023-02-09 NOTE — PLAN OF CARE
[facility]  Discharge Final Note    Primary Care Provider: Alexa Ibarra MD    Expected Discharge Date: 2/6/2023    Final Discharge Note (most recent)       Final Note - 02/06/23 1811          Final Note    Assessment Type Final Discharge Note     Anticipated Discharge Disposition Home or Self Care     Hospital Resources/Appts/Education Provided Provided patient/caregiver with written discharge plan information;Appointments scheduled by Navigator/Coordinator                   Future Appointments   Date Time Provider Department Center   2/13/2023  9:00 AM Colton Dizacomo, PT [facility]   2/15/2023  3:00 PM Colton Dizacomo, PT [facility]   2/16/2023  9:00 AM Colton Dizacomo, PT [facility]   2/17/2023  3:15 PM BECCA [facility]   2/20/2023  9:00 AM Colton Dizacomo, PT [facility]   2/22/2023  9:00 AM Colton Dizacomo, PT [facility]   2/27/2023  9:00 AM Colton Dizacomo, PT [facility]   3/1/2023 10:00 AM Colton Dizacomo, PT [facility]   3/2/2023  9:00 AM Colton Dizacomo, PT [facility]   3/6/2023  9:00 AM Colton Dizacomo, PT [facility]   3/7/2023  9:00 AM Colton Dizacomo, PT [facility]   3/8/2023  9:00 AM Colton Dizacomo, PT [facility]   3/13/2023  9:00 AM Colton Dizacomo, PT [facility]   3/15/2023  9:00 AM Colton Dizacomo, PT [facility]   3/16/2023  9:00 AM Colton Dizacomo, PT [facility]   3/17/2023 11:00 AM LINDEN [facility]   3/20/2023  9:00 AM Colton Dizacomo, PT [facility]   3/21/2023  9:00 AM Colton Lewis, PT [facility]   3/22/2023  9:00 AM Colton Lewis, PT [facility]

## 2023-02-13 ENCOUNTER — CLINICAL SUPPORT (OUTPATIENT)
Dept: REHABILITATION | Facility: HOSPITAL | Age: 83
End: 2023-02-13
Attending: HOSPITALIST
Payer: MEDICAID

## 2023-02-13 DIAGNOSIS — S72.001D CLOSED FRACTURE OF NECK OF RIGHT FEMUR WITH ROUTINE HEALING, SUBSEQUENT ENCOUNTER: Primary | ICD-10-CM

## 2023-02-13 PROCEDURE — 97110 THERAPEUTIC EXERCISES: CPT | Mod: PO

## 2023-02-13 NOTE — PROGRESS NOTES
"  [facility]   Physical Therapy Treatment Note     Name: Agustin Harper [facility]  Clinic Number: [id number]    Therapy Diagnosis:   Encounter Diagnosis   Name Primary?    Closed fracture of neck of right femur with routine healing, subsequent encounter Yes     Physician: Alexa Ibarra MD    Visit Date: 2/13/2023    Physician Orders: PT Eval and Treat  Medical Diagnosis from Referral: S72.001A (ICD-10-CM) - Closed fracture of neck of right femur, initial encounter  Evaluation Date: 2/8/2023  Authorization Period Expiration: 2/5/2024  Plan of Care Expiration: 3/24/2023  Progress Note Due: 3/8/2023  Visit # / Visits authorized: 2/21  FOTO: 0/3    PTA Visit #: 0/5     Time In: 9:05 am  Time Out: 10:10 am  Total Billable Time: 65 minutes    SUBJECTIVE     Patient reporting he obtained new 2WW which is appropriate height, and is ambulating into clinic with improved step through pattern and gait speed today. States HEP is going well, and that he is walking frequently at home with his 2WW as instructed.    He was compliant with home exercise program.    Response to previous treatment: Initial follow up visit.  Functional change: Improved gait speed and step through pattern.    Pain: 3/10  Location: Right hip     OBJECTIVE     Objective Measures updated at progress report unless specified.     Treatment     Agustin received the treatments listed below:      therapeutic exercises to develop strength, endurance, ROM, and flexibility for 55 minutes including:  Recumbent Bike: 10 minutes, level 1, to improve hip ROM and LE endurance    Supine:  PROM - Hip Flexion to 90 degrees, Hip Abduction to tolerance: 3x30 each motion  Quad Sets: 3x20 with 3" hold  Short Arc Quads: 3x20  Glute Squeezes: 3x20 with 3" hold    Seated:  Clam Shells: 3x20  Marches: 3x10 AAROM  Hip Adduction Pillow Squeeze: 3x20 with 3" hold  Sit<>Stands: 3x5 with CGA    neuromuscular re-education activities to improve: Balance. " Coordination, Kinesthetic, Proprioception, and Posture for 0 minutes. The following activities were included:  None    therapeutic activities to improve functional performance for 0 minutes, including:  None    gait training to improve functional mobility and safety for 0  minutes, including:  None    Cold pack for 10 minutes to right hip.    Patient Education and Home Exercises     Home Exercises Provided and Patient Education Provided     Education provided:   - Home walking program  - Cryotherapy for pain control    Written Home Exercises Provided: Patient instructed to cont prior HEP. Exercises were reviewed and Agustin was able to demonstrate them prior to the end of the session.  Agustin demonstrated good  understanding of the education provided. See EMR under Patient Instructions for exercises provided during therapy sessions    ASSESSMENT     Patient ambulating with improved gait speed and has progressed from step to gait pattern to step through gait pattern, while also having obtained appropriate height 2WW. Session focused on review/supervised performance of initial HEP with progressions applied for patient tolerance. Patient with fair tolerance of session, reporting mild discomfort with end-range hip abduction during PROM exercises. Instructed patient to continue with his current HEP and home walking program which he verbalized understanding of.    Agustin Is progressing well towards his goals.    Pt prognosis is Good.     Pt will continue to benefit from skilled outpatient physical therapy to address the deficits listed in the problem list box on initial evaluation, provide pt/family education and to maximize pt's level of independence in the home and community environment.     Pt's spiritual, cultural and educational needs considered and pt agreeable to plan of care and goals.     Anticipated barriers to physical therapy: language barrier, compliance, scheduling, transportation dependence    Goals:  Short  Term Goals: 4 weeks   Patient will be independent with initial HEP for right lower extremity flexibility and functional strength to improve therapy outcomes. (Progressing)  Patient will improve right hip strength to >/= 3/5 to improve standing tolerance.  Patient will improve TUG test time from 52.30 seconds with 2WW to </= 35 seconds with 2WW to demonstrate decreased fall risk with ambulation.  Patient will improve modified 30 second sit to stand score from 4 repetitions to >/= 8 repetitions to demonstrate improved functional lower extremity strength.     Long Term Goals: 8 weeks   Patient will be independent with final HEP for right lower extremity strength, flexibility, and functional mobility to encourage long term functional independence.  Patient will improve right hip strength to >/= 4-/5 in all tested planes to improve his ability to perform household ADLs and navigate stairs.  Patient will demonstrate independence with sit<>stands to improve his ability to navigate his home.  Patient will improve TUG test time from 52.30 seconds to </= 20 seconds to demonstrate decreased fall risk with functional mobility.    PLAN     Continue progressing per patient tolerance and POC.    Colton Lewis, PT, DPT

## 2023-02-15 ENCOUNTER — CLINICAL SUPPORT (OUTPATIENT)
Dept: REHABILITATION | Facility: HOSPITAL | Age: 83
End: 2023-02-15
Attending: HOSPITALIST
Payer: MEDICAID

## 2023-02-15 DIAGNOSIS — S72.001D CLOSED FRACTURE OF NECK OF RIGHT FEMUR WITH ROUTINE HEALING, SUBSEQUENT ENCOUNTER: Primary | ICD-10-CM

## 2023-02-15 PROCEDURE — 97110 THERAPEUTIC EXERCISES: CPT | Mod: PO

## 2023-02-15 NOTE — PROGRESS NOTES
[facility]   Physical Therapy Treatment Note     Name: Agustin Harper [facility]  Clinic Number: [id number]    Therapy Diagnosis:   No diagnosis found.    Physician: Alexa Ibarra MD    Visit Date: 2/15/2023    Physician Orders: PT Eval and Treat  Medical Diagnosis from Referral: S72.001A (ICD-10-CM) - Closed fracture of neck of right femur, initial encounter  Evaluation Date: 2023  Authorization Period Expiration: 2024  Plan of Care Expiration: 3/24/2023  Progress Note Due: 3/8/2023  Visit # / Visits authorized: 3/21  FOTO: 0/3    PTA Visit #: 0/5     Time In: 3:00 pm  Time Out: 4:15 pm  Total Billable Time: 65 minutes (+10 minutes ice)    SUBJECTIVE     Patient reports that he is performing his HEP at home 2x/day.    He was compliant with home exercise program.    Response to previous treatment: Initial follow up visit.  Functional change: Improved gait speed and step through pattern.    Pain: 0/10  Location: Right hip     OBJECTIVE     Objective Measures updated at progress report unless specified.     Treatment     Agustin received the treatments listed below:      Therapeutic Exercises to develop strength, endurance, ROM, and flexibility for 65 minutes including:  Recumbent Bike: 10 minutes, level 3, to improve hip ROM and LE endurance    Supine:  Short Arc Quads: 3x15 with 3 second hold, 2.5# cuff weight  Mini Bridges: 3x15    Seated:  Clam Shells: 3x20  Marches: 3x20  Sit<>Stands: 3x5 with CGA    Standing in 2ww:  Alternating Marches: 3x20  Heel Raises: 3x10  Hamsttring Curls: 3x10    Standing in // Bars:  Sidesteppin laps    neuromuscular re-education activities to improve: Balance. Coordination, Kinesthetic, Proprioception, and Posture for 0 minutes. The following activities were included:  None    therapeutic activities to improve functional performance for 0 minutes, including:  None    gait training to improve functional mobility and safety for 0  minutes,  including:  None    Cold pack for 10 minutes to right hip.    Patient Education and Home Exercises     Home Exercises Provided and Patient Education Provided     Education provided:   - Exercise rationale  - Proper setup for sit to stands    Written Home Exercises Provided: Patient instructed to cont prior HEP. Exercises were reviewed and Agustin was able to demonstrate them prior to the end of the session.  Agustin demonstrated good  understanding of the education provided. See EMR under Patient Instructions for exercises provided during therapy sessions    ASSESSMENT     Session focused on progression of standing exercises with initiation of standing heel raises, hamstring curls, standing marches, and sidestepping in // bars. Patient with excellent tolerance of session reporting no adverse effects during treatment, and demonstrating ability to perform marches without need for manual assistance. Instructed patient to continue with current HEP, and to plan for updated HEP handout at the end of his next session, which he and family members verbalized understanding of.    Agustin Is progressing well towards his goals.    Pt prognosis is Good.     Pt will continue to benefit from skilled outpatient physical therapy to address the deficits listed in the problem list box on initial evaluation, provide pt/family education and to maximize pt's level of independence in the home and community environment.     Pt's spiritual, cultural and educational needs considered and pt agreeable to plan of care and goals.     Anticipated barriers to physical therapy: language barrier, compliance, scheduling, transportation dependence    Goals:  Short Term Goals: 4 weeks   Patient will be independent with initial HEP for right lower extremity flexibility and functional strength to improve therapy outcomes. (Progressing)  Patient will improve right hip strength to >/= 3/5 to improve standing tolerance.  Patient will improve TUG test time from  52.30 seconds with 2WW to </= 35 seconds with 2WW to demonstrate decreased fall risk with ambulation.  Patient will improve modified 30 second sit to stand score from 4 repetitions to >/= 8 repetitions to demonstrate improved functional lower extremity strength.     Long Term Goals: 8 weeks   Patient will be independent with final HEP for right lower extremity strength, flexibility, and functional mobility to encourage long term functional independence.  Patient will improve right hip strength to >/= 4-/5 in all tested planes to improve his ability to perform household ADLs and navigate stairs.  Patient will demonstrate independence with sit<>stands to improve his ability to navigate his home.  Patient will improve TUG test time from 52.30 seconds to </= 20 seconds to demonstrate decreased fall risk with functional mobility.    PLAN     Continue progressing per patient tolerance and POC.    Colton Lewis, PT, DPT

## 2023-02-16 ENCOUNTER — CLINICAL SUPPORT (OUTPATIENT)
Dept: REHABILITATION | Facility: HOSPITAL | Age: 83
End: 2023-02-16
Attending: HOSPITALIST
Payer: MEDICAID

## 2023-02-16 DIAGNOSIS — S72.001D CLOSED FRACTURE OF NECK OF RIGHT FEMUR WITH ROUTINE HEALING, SUBSEQUENT ENCOUNTER: Primary | ICD-10-CM

## 2023-02-16 PROCEDURE — 97110 THERAPEUTIC EXERCISES: CPT | Mod: PO

## 2023-02-16 NOTE — PROGRESS NOTES
[facility]   Physical Therapy Treatment Note     Name: Agustin Almontedo  Clinic Number: [id number]    Therapy Diagnosis:   No diagnosis found.    Physician: Alexa Ibarra MD    Visit Date: 2/16/2023    Physician Orders: PT Eval and Treat  Medical Diagnosis from Referral: S72.001A (ICD-10-CM) - Closed fracture of neck of right femur, initial encounter  Evaluation Date: 2/8/2023  Authorization Period Expiration: 2/5/2024  Plan of Care Expiration: 3/24/2023  Progress Note Due: 3/8/2023  Visit # / Visits authorized: 4/21  FOTO: 0/3    PTA Visit #: 0/5     Time In: 9:00 am  Time Out: 10:00 am  Total Billable Time: 60 minutes    SUBJECTIVE     Patient reports that he is tired today, and denies pain upon arrival. States continued compliance with HEP.    He was compliant with home exercise program.    Response to previous treatment: Fatigue  Functional change: Improved gait speed and step through pattern.    Pain: 0/10  Location: Right hip     OBJECTIVE     Objective Measures updated at progress report unless specified.     Treatment     Agustin received the treatments listed below:      *Medicaid Payor: Bill all units as therapeutic exercise*    Therapeutic Exercises to develop strength, endurance, ROM, and flexibility for 50 minutes including:  Recumbent Bike: 10 minutes, level 3, to improve hip ROM and LE endurance    Supine:  PROM: hip flexion to 90 degrees 1x30, hip abduction to tolerance 1x30, hip external rotation to tolerance 1x30  Bridges: 3x15  Clam Shells: 3x15 with red theraband    Seated:  Long Arc Quads: 3x10    Standing in 2ww (performed in circuit style):  Right Lower Extremity Marches: 3x20  Heel Raises: 3x10  Hamsttring Curls: 3x10    neuromuscular re-education activities to improve: Balance. Coordination, Kinesthetic, Proprioception, and Posture for 0 minutes. The following activities were included:  None    therapeutic activities to improve functional performance for 10  minutes, including:  Sit<>Stands: 3x10 reps, progressed from handheld assist to hands on thighs  Sidesteppin laps in // bars    gait training to improve functional mobility and safety for 0  minutes, including:  None    Patient Education and Home Exercises     Home Exercises Provided and Patient Education Provided     Education provided:   - Exercise rationale  - Proper setup for sit to stands    Written Home Exercises Provided: Patient instructed to cont prior HEP. Exercises were reviewed and Agustin was able to demonstrate them prior to the end of the session.  Agustin demonstrated good  understanding of the education provided. See EMR under Patient Instructions for exercises provided during therapy sessions    ASSESSMENT     Session focused on progression of therapeutic exercises to improve right lower extremity strength in supine, seated, and standing positions. Patient able to perform standing exercises in circuit fashion today and required less frequent rest breaks overall despite being fatigued upon arrival. Provided patient with updated HEP at end of session which he and family verbalized understanding of.    Agustin Is progressing well towards his goals.    Pt prognosis is Good.     Pt will continue to benefit from skilled outpatient physical therapy to address the deficits listed in the problem list box on initial evaluation, provide pt/family education and to maximize pt's level of independence in the home and community environment.     Pt's spiritual, cultural and educational needs considered and pt agreeable to plan of care and goals.     Anticipated barriers to physical therapy: language barrier, compliance, scheduling, transportation dependence    Goals:  Short Term Goals: 4 weeks   Patient will be independent with initial HEP for right lower extremity flexibility and functional strength to improve therapy outcomes. (Progressing)  Patient will improve right hip strength to >/= 3/5 to improve standing  tolerance.  Patient will improve TUG test time from 52.30 seconds with 2WW to </= 35 seconds with 2WW to demonstrate decreased fall risk with ambulation.  Patient will improve modified 30 second sit to stand score from 4 repetitions to >/= 8 repetitions to demonstrate improved functional lower extremity strength.     Long Term Goals: 8 weeks   Patient will be independent with final HEP for right lower extremity strength, flexibility, and functional mobility to encourage long term functional independence.  Patient will improve right hip strength to >/= 4-/5 in all tested planes to improve his ability to perform household ADLs and navigate stairs.  Patient will demonstrate independence with sit<>stands to improve his ability to navigate his home.  Patient will improve TUG test time from 52.30 seconds to </= 20 seconds to demonstrate decreased fall risk with functional mobility.    PLAN     Continue progressing per patient tolerance and POC.    Colton Lewis, PT, DPT

## 2023-02-17 ENCOUNTER — OFFICE VISIT (OUTPATIENT)
Dept: ORTHOPEDICS | Facility: CLINIC | Age: 83
End: 2023-02-17
Payer: MEDICAID

## 2023-02-17 VITALS — WEIGHT: 136.69 LBS | BODY MASS INDEX: 19.14 KG/M2 | HEIGHT: 71 IN

## 2023-02-17 DIAGNOSIS — S72.001D CLOSED FRACTURE OF NECK OF RIGHT FEMUR WITH ROUTINE HEALING, SUBSEQUENT ENCOUNTER: Primary | ICD-10-CM

## 2023-02-17 DIAGNOSIS — Z98.890 POST-OPERATIVE STATE: ICD-10-CM

## 2023-02-17 PROCEDURE — 1126F AMNT PAIN NOTED NONE PRSNT: CPT | Mod: CPTII,,, | Performed by: NURSE PRACTITIONER

## 2023-02-17 PROCEDURE — 1160F PR REVIEW ALL MEDS BY PRESCRIBER/CLIN PHARMACIST DOCUMENTED: ICD-10-PCS | Mod: CPTII,,, | Performed by: NURSE PRACTITIONER

## 2023-02-17 PROCEDURE — 99213 OFFICE O/P EST LOW 20 MIN: CPT | Mod: PBBFAC | Performed by: NURSE PRACTITIONER

## 2023-02-17 PROCEDURE — 1160F RVW MEDS BY RX/DR IN RCRD: CPT | Mod: CPTII,,, | Performed by: NURSE PRACTITIONER

## 2023-02-17 PROCEDURE — 99024 PR POST-OP FOLLOW-UP VISIT: ICD-10-PCS | Mod: ,,, | Performed by: NURSE PRACTITIONER

## 2023-02-17 PROCEDURE — 99999 PR PBB SHADOW E&M-EST. PATIENT-LVL III: ICD-10-PCS | Mod: PBBFAC,,, | Performed by: NURSE PRACTITIONER

## 2023-02-17 PROCEDURE — 1126F PR PAIN SEVERITY QUANTIFIED, NO PAIN PRESENT: ICD-10-PCS | Mod: CPTII,,, | Performed by: NURSE PRACTITIONER

## 2023-02-17 PROCEDURE — 1159F PR MEDICATION LIST DOCUMENTED IN MEDICAL RECORD: ICD-10-PCS | Mod: CPTII,,, | Performed by: NURSE PRACTITIONER

## 2023-02-17 PROCEDURE — 1159F MED LIST DOCD IN RCRD: CPT | Mod: CPTII,,, | Performed by: NURSE PRACTITIONER

## 2023-02-17 PROCEDURE — 99024 POSTOP FOLLOW-UP VISIT: CPT | Mod: ,,, | Performed by: NURSE PRACTITIONER

## 2023-02-17 PROCEDURE — 99999 PR PBB SHADOW E&M-EST. PATIENT-LVL III: CPT | Mod: PBBFAC,,, | Performed by: NURSE PRACTITIONER

## 2023-02-17 NOTE — PROGRESS NOTES
Mr. Leroy Hernandes is here today for a post-operative visit after undergoing a right DHEERAJ, anterior approach by Dr. Nunez on 2/3/2023.    Interval History:  He reports that he is doing well.  He reports no pain.  He is not taking pain medication.  He is going to outpatient physical therapy with [facility].  He is walking with a rolling walker.  Denies any falls or injuries since his surgery.  Denies hip or groin pain.  Is planning to return home to [place] in the next few weeks.  He does have an orthopedist back at home.  He denies fever, chills, and sweats since the time of the surgery.     Physical exam:  Dressing taken down.  Incision is clean, dry and intact.  No signs of infection seen.  Patient is able to stand independently.  He is able to take several steps independently.  Skin was closed with Dermabond and Stratifix ends were clipped.  He has tactile stimulation to their lower leg, he denies calf pain, there is no leg edema and their pedal pulse is palpable x 2.     RADS: none done today    Assessment:  Post-op visit (2 weeks)    Plan:    ICD-10-CM ICD-9-CM   1. Closed fracture of neck of right femur with routine healing, subsequent encounter  S72.001D V54.13   2. Post-operative state  Z98.890 V45.89     Current care, treatment plan, precautions, activity level/ modifications, limitations, rehabilitation exercises and proposed future treatment were discussed with the patient. We discussed the need to monitor for changes in symptoms and condition and report them to the physician.  Discussed importance of compliance with all appointments and follow up examinations.     WOUND CARE ORDERS  -Agustin was advised to keep the incision clean and dry for the next 24 hours after which he may wash the area with antibacterial soap in the shower.   -He not submerge until the incision is completely healed  -Patient was advised to monitor wound closely and multiple times daily for any problems. Call clinic immediately or  report to ED for immediate medical attention for any complications including reopening of wound, drainage, purulence, redness, streaking, odor, pain out of proportion, fever, chills, etc.   - If there are signs of infection, please call [facility] [phone] for further instructions and to make appt to be seen.       PHYSICAL THERAPY:   - Continue therapy as ordered.  - Weight bearing as tolerated   - Range of motion as tolerated. Needs to avoid extremes of motion but no specific hip precautions     PAIN MEDICATION:   - Pain medication: refill was not needed  - Pain medication refill policy provided to patient for review, yes.    - Patient was informed a multi-modal approach is used to treat their pain.     DVT PROPHYLAXIS:   - Eliquis 2.5 mg bid x 4 weeks.     FOLLOW UP:   - Patient will follow up in the clinic in 4 weeks.  - X-ray of his right hip is needed.  - I advised the patient to get a copy of his medical records prior to him returning home to [place].  He reports he has an Orthopedist at home.  In the meantime I do recommend that he walk on the flight and if done with Eliquis take aspirin the day of the day before and day after his flight.  - Future Appointments:   Future Appointments   Date Time Provider Department Center   2/20/2023  9:00 AM Colton Dizacomo, PT [facility]   2/22/2023  9:00 AM Colton Dizacomo, PT [facility]   2/27/2023  9:00 AM Colton Dizacomo, PT [facility]   3/1/2023 10:00 AM Colton Dizacomo, PT [facility]   3/2/2023  9:00 AM Colton Dizacomo, PT [facility]   3/6/2023  9:00 AM Colton Dizacomo, PT [facility]   3/7/2023  9:00 AM Colton Dizacomo, PT [facility]   3/8/2023  9:00 AM Colton Dizacomo, PT [facility]   3/13/2023  9:00 AM Colton Dizacomo, PT [facility]   3/15/2023  9:00 AM Colton Dizacomo, PT [facility]   3/16/2023  9:00 AM Colton Lewis PT  [facility]   3/17/2023 11:00 AM Galina Snu PA-C [facility]   3/20/2023  9:00 AM Colton Lewis, PT [facility]   3/21/2023  9:00 AM Colton Lewis, PT [facility]   3/22/2023  9:00 AM Colton Lewis, PT [facility]           If there are any questions prior to scheduled follow up, the patient was instructed to contact the office

## 2023-02-20 ENCOUNTER — CLINICAL SUPPORT (OUTPATIENT)
Dept: REHABILITATION | Facility: HOSPITAL | Age: 83
End: 2023-02-20
Attending: HOSPITALIST
Payer: MEDICAID

## 2023-02-20 DIAGNOSIS — S72.001D CLOSED FRACTURE OF NECK OF RIGHT FEMUR WITH ROUTINE HEALING, SUBSEQUENT ENCOUNTER: Primary | ICD-10-CM

## 2023-02-20 PROCEDURE — 97530 THERAPEUTIC ACTIVITIES: CPT | Mod: PO

## 2023-02-20 PROCEDURE — 97110 THERAPEUTIC EXERCISES: CPT | Mod: PO

## 2023-02-20 NOTE — PROGRESS NOTES
"  [facility]   Physical Therapy Treatment Note     Name: Agustin Harper [facility]  Clinic Number: [id number]    Therapy Diagnosis:   No diagnosis found.    Physician: Alexa Ibarra MD    Visit Date: 2/20/2023    Physician Orders: PT Eval and Treat  Medical Diagnosis from Referral: S72.001A (ICD-10-CM) - Closed fracture of neck of right femur, initial encounter  Evaluation Date: 2/8/2023  Authorization Period Expiration: 2/5/2024  Plan of Care Expiration: 3/24/2023  Progress Note Due: 3/8/2023  Visit # / Visits authorized: 5/21  FOTO: 0/3    PTA Visit #: 0/5     Time In: 9:00 am  Time Out: 10:00 am  Total Billable Time: 40 minutes 1:1 time    SUBJECTIVE     Patient's daughter reporting that the patient was compliant with his updated home exercise program over the weekend. Patient again reporting that he is tired this morning, though is having no baseline pain in his right hip.    He was compliant with home exercise program.    Response to previous treatment: Fatigue  Functional change: Improved gait speed and step through pattern.    Pain: 0/10  Location: Right hip     OBJECTIVE     Objective Measures updated at progress report unless specified.     Treatment     Agustin received the treatments listed below:      *Medicaid Payor: Bill all units as therapeutic exercise*    Therapeutic Exercises to develop strength, endurance, ROM, and flexibility for 30 minutes including:  Recumbent Bike: 10 minutes, level 3, to improve hip ROM and LE endurance    Supine:  PROM: hip flexion to 90 degrees 1x30, hip abduction to tolerance 1x30  Manual Hip Extension Stretch to Tolerance: 4x30" in left side lying  Bridges: 3x15  Clam Shells: 3x15 with red theraband    Seated:  Long Arc Quads: 3x15    Standing in 2ww (performed in circuit style):  Right Lower Extremity Marches: 3x20  Heel Raises: 2x15    neuromuscular re-education activities to improve: Balance. Coordination, Kinesthetic, Proprioception, and Posture " for 0 minutes. The following activities were included:  None    therapeutic activities to improve functional performance for 10 minutes, including:  Sit<>Stands: 2x15 reps, hands on thighs  Sidesteppin laps in // bars over side ways hurdles    gait training to improve functional mobility and safety for 0  minutes, including:  None    Patient Education and Home Exercises     Home Exercises Provided and Patient Education Provided     Education provided:   - Exercise rationale  - Proper setup for sit to stands    Written Home Exercises Provided: Patient instructed to cont prior HEP. Exercises were reviewed and Agustin was able to demonstrate them prior to the end of the session.  Agustin demonstrated good  understanding of the education provided. See EMR under Patient Instructions for exercises provided during therapy sessions    ASSESSMENT     Patient demonstrates decreased right hip extension ROM, with pain at end-range during manual stretching in that direction in side lying. Added prone lying to HEP with instructions for daughter to trial stretch with patient at home which she and patient verbalized understanding of. Patient demonstrate improper hip hinge pattern with sit to stands, though was able to improve his form with tactile. Also demonstrated improved foot clearance during side stepping activities today with progressions to stepping over hurdles.    Agustin Is progressing well towards his goals.    Pt prognosis is Good.     Pt will continue to benefit from skilled outpatient physical therapy to address the deficits listed in the problem list box on initial evaluation, provide pt/family education and to maximize pt's level of independence in the home and community environment.     Pt's spiritual, cultural and educational needs considered and pt agreeable to plan of care and goals.     Anticipated barriers to physical therapy: language barrier, compliance, scheduling, transportation  dependence    Goals:  Short Term Goals: 4 weeks   Patient will be independent with initial HEP for right lower extremity flexibility and functional strength to improve therapy outcomes. (Progressing)  Patient will improve right hip strength to >/= 3/5 to improve standing tolerance.  Patient will improve TUG test time from 52.30 seconds with 2WW to </= 35 seconds with 2WW to demonstrate decreased fall risk with ambulation.  Patient will improve modified 30 second sit to stand score from 4 repetitions to >/= 8 repetitions to demonstrate improved functional lower extremity strength.     Long Term Goals: 8 weeks   Patient will be independent with final HEP for right lower extremity strength, flexibility, and functional mobility to encourage long term functional independence.  Patient will improve right hip strength to >/= 4-/5 in all tested planes to improve his ability to perform household ADLs and navigate stairs.  Patient will demonstrate independence with sit<>stands to improve his ability to navigate his home.  Patient will improve TUG test time from 52.30 seconds to </= 20 seconds to demonstrate decreased fall risk with functional mobility.    PLAN     Continue progressing per patient tolerance and POC.    Colton Lewis, PT, DPT

## 2023-02-22 ENCOUNTER — CLINICAL SUPPORT (OUTPATIENT)
Dept: REHABILITATION | Facility: HOSPITAL | Age: 83
End: 2023-02-22
Attending: HOSPITALIST

## 2023-02-22 DIAGNOSIS — S72.001D CLOSED FRACTURE OF NECK OF RIGHT FEMUR WITH ROUTINE HEALING, SUBSEQUENT ENCOUNTER: Primary | ICD-10-CM

## 2023-02-22 PROCEDURE — 97110 THERAPEUTIC EXERCISES: CPT | Mod: PO

## 2023-02-22 NOTE — PROGRESS NOTES
[facility]   Physical Therapy Treatment Note     Name: Agustin Hernandes  Clinic Number: [id number]    Therapy Diagnosis:   No diagnosis found.    Physician: Alexa Ibarra MD    Visit Date: 2/22/2023    Physician Orders: PT Eval and Treat  Medical Diagnosis from Referral: S72.001A (ICD-10-CM) - Closed fracture of neck of right femur, initial encounter  Evaluation Date: 2/8/2023  Authorization Period Expiration: 2/5/2024  Plan of Care Expiration: 3/24/2023  Progress Note Due: 3/8/2023  Visit # / Visits authorized: 5/6  FOTO: 0/3  PTA Visit #: 0/5     Time In: 9:00 am  Time Out: 10:00 am  Total Billable Time: 60 minutes    SUBJECTIVE     Patient reports he is walking, getting in/out of bed, getting into/out of the car, and is performing self care activities at home with increased independence and ease each day. He reports that he feels he is 80% back to his prior level of function, and would like to trial ambulation with a cane today. States he is now able to navigate four (4) stairs at at time when using handrails and taking short rest breaks as needed.    He was compliant with home exercise program.    Response to previous treatment: Fatigue.  Functional change: See above.    Pain: 0/10  Location: Right hip     OBJECTIVE     Observation: Patient now ambulating into clinic with improved gait speed and upright posture utilizing step through gait pattern with 2ww adjusted to proper height and no evidence of gait antalgia.     Lower Extremity Strength    Right Lower Extremity Left Lower Extremity   Hip Flexion 3/5 4-/5   Hip Abduction 3/5 4-/5   Hip Extension 3-/5 4+/5   Hip External Rotation 3+/5 4-/5   Hip Internal Rotation 3+/5 4-/5   Knee Extension 4-/5 5/5   Knee Flexion 4-/5 4+/5      Functional Testing:  TUG Test: 22.39 seconds with 2WW  30 Second Sit to Stand - Modified with Bilateral Upper Extremity Assist: 9 repetitions wheelchair<>2WW    Treatment     Agustin received the  treatments listed below:      *Medicaid Payor: Bill all units as therapeutic exercise*    Therapeutic Exercises to develop strength, endurance, ROM, and flexibility for 15 minutes including:  Recumbent Bike: 8 minutes, level 1, to improve right hip ROM and right lower extremity endurance    Standing in // Bars:  Hip Abduction: 2x15 reps, bilateral    Therapeutic Activities to improve functional performance for 30 minutes, including:  Objective Measures Reassessment (15 minutes)  Step Ups at Stairs: 2x15 leading with right lower extremity  Sit<>Stands: 2x15 reps, hands on thighs  Sidesteppin laps in // bars over side ways hurdles    Gait Training to improve functional mobility and safety for 15  minutes, including:  Quad Cane, step through pattern, [id number]  Single Point Cane, step through pattern, [id number]    Patient Education and Home Exercises     Home Exercises Provided and Patient Education Provided     Education provided:   - Continued compliance with HEP    Written Home Exercises Provided: Patient instructed to cont prior HEP. Exercises were reviewed and Agustin was able to demonstrate them prior to the end of the session.  Agustin demonstrated good  understanding of the education provided. See EMR under Patient Instructions for exercises provided during therapy sessions    ASSESSMENT     Agustin Hernandes is an 82 year old male referred to physical therapy s/p right total hip arthroplasty performed on 2023 following a traumatic right hip fracture which occurred when the patient tripped and fell on 2023. The patient has made excellent progress since starting physical therapy and has met all of his short term PT goals at this time, demonstrating improved right hip and knee strength, improved TUG test time, increased modified 30 second sit to stand test score, and demonstrating compliance with initial home exercise program. He continues to present with decreased right lower extremity strength,  impaired balance, decreased endurance, and gait antalgia,and will continue to benefit from skilled PT services 2x/week for an additional 6 weeks to address the aforementioned impairments, meet his remaining long term PT goals, and return to as close to his PLOF as possible in preparation for returning to his home [place] [facility] [place].    Agustin Is progressing well towards his goals.    Pt prognosis is Good.     Pt will continue to benefit from skilled outpatient physical therapy to address the deficits listed in the problem list box on initial evaluation, provide pt/family education and to maximize pt's level of independence in the home and community environment.     Pt's spiritual, cultural and educational needs considered and pt agreeable to plan of care and goals.     Anticipated barriers to physical therapy: language barrier, compliance, scheduling, transportation dependence    Goals:  Short Term Goals: 4 weeks   Patient will be independent with initial HEP for right lower extremity flexibility and functional strength to improve therapy outcomes. (Met)  Patient will improve right hip strength to >/= 3/5 to improve standing tolerance. (Met)  Patient will improve TUG test time from 52.30 seconds with 2WW to </= 35 seconds with 2WW to demonstrate decreased fall risk with ambulation. (Met)  Patient will improve modified 30 second sit to stand score from 4 repetitions to >/= 8 repetitions to demonstrate improved functional lower extremity strength. (Met)     Long Term Goals: 8 weeks   Patient will be independent with final HEP for right lower extremity strength, flexibility, and functional mobility to encourage long term functional independence. (Progressing)  Patient will improve right hip strength to >/= 4-/5 in all tested planes to improve his ability to perform household ADLs and navigate stairs. (Progressing)  Patient will demonstrate independence with sit<>stands to improve his ability to navigate his home.  (Progressing)  Patient will improve TUG test time from 52.30 seconds to </= 20 seconds to demonstrate decreased fall risk with functional mobility. (Progressing)    PLAN     Continue progressing per patient tolerance and POC.    Colton Lewis, PT, DPT

## 2023-02-27 ENCOUNTER — CLINICAL SUPPORT (OUTPATIENT)
Dept: REHABILITATION | Facility: HOSPITAL | Age: 83
End: 2023-02-27
Attending: HOSPITALIST
Payer: MEDICAID

## 2023-02-27 DIAGNOSIS — S72.001D CLOSED FRACTURE OF NECK OF RIGHT FEMUR WITH ROUTINE HEALING, SUBSEQUENT ENCOUNTER: Primary | ICD-10-CM

## 2023-02-27 PROCEDURE — 97110 THERAPEUTIC EXERCISES: CPT | Mod: PO

## 2023-02-27 NOTE — PROGRESS NOTES
[facility]   Physical Therapy Treatment Note     Name: Agustin Harper Hernandes  Clinic Number: [id number]    Therapy Diagnosis:   Encounter Diagnosis   Name Primary?    Closed fracture of neck of right femur with routine healing, subsequent encounter Yes       Physician: Alexa Ibarra MD    Visit Date: 2/27/2023    Physician Orders: PT Eval and Treat  Medical Diagnosis from Referral: S72.001A (ICD-10-CM) - Closed fracture of neck of right femur, initial encounter  Evaluation Date: 2/8/2023  Authorization Period Expiration: 2/5/2024  Plan of Care Expiration: 3/24/2023  Progress Note Due: 3/8/2023  Visit # / Visits authorized: 6/6  FOTO: 0/3  PTA Visit #: 0/5    Time In: 9:00 am  Time Out: 10:10 am  Total Billable Time: 60 minutes (+10 minutes ice)    SUBJECTIVE     Patient ambulating into clinic with single point cane though with mild unsteadiness requiring close SBA. Per daughter, patient has been practicing walking with cane at home per PT instruction last visit. Patient's daughter reporting she is concerned about patient's level of fatigue due to history of COPD/EPOC.    He was compliant with home exercise program.    Response to previous treatment: Fatigue.  Functional change: Ambulating with single point cane, mild unsteadiness requiring close SBA at all times.    Pain: 0/10  Location: Right hip     OBJECTIVE     Observation: Patient now ambulating into clinic with improved gait speed and upright posture utilizing step through gait pattern with 2ww adjusted to proper height and no evidence of gait antalgia.     Lower Extremity Strength    Right Lower Extremity Left Lower Extremity   Hip Flexion 3/5 4-/5   Hip Abduction 3/5 4-/5   Hip Extension 3-/5 4+/5   Hip External Rotation 3+/5 4-/5   Hip Internal Rotation 3+/5 4-/5   Knee Extension 4-/5 5/5   Knee Flexion 4-/5 4+/5      Functional Testing:  TUG Test: 22.39 seconds with 2WW  30 Second Sit to Stand - Modified with Bilateral Upper  Extremity Assist: 9 repetitions chair<>2WW    Treatment     Agustin received the treatments listed below:      *Medicaid Payor: Bill all units as therapeutic exercise*    Therapeutic Exercises to develop strength, endurance, ROM, and flexibility for 30 minutes including:  Recumbent Bike: 10 minutes, level 3, to improve right hip ROM and right lower extremity endurance    Standing:  Heel Raises: 2x15 reps  Hip Abduction: 2x15 reps, bilateral  Alternating Lower Extremity Marches: 2x20  Leg Press: 4x10, 70#  Knee Extension Machine: 4x10, 10#    Therapeutic Activities to improve functional performance for 30 minutes, including:  Step Ups at Stairs: 3x15 leading with right lower extremity  Sit<>Stands: 2x15 reps, hands on thighs  Sidesteppin laps in // bars over side ways hurdles    Ice pack to right hip for 10 minutes    Patient Education and Home Exercises     Home Exercises Provided and Patient Education Provided     Education provided:   - Continued compliance with HEP    Written Home Exercises Provided: Patient instructed to cont prior HEP. Exercises were reviewed and Agustin was able to demonstrate them prior to the end of the session.  Agustin demonstrated good  understanding of the education provided. See EMR under Patient Instructions for exercises provided during therapy sessions    ASSESSMENT     Session focused on progression of lower extremity strengthening exercises with addition of leg press machine and knee extension machine. Patient with good tolerance of exercise progressions today with no adverse effects during treatment. Educated patient's family to ensure patient always has close standby assist when ambulating with single point cane due to mild unsteadiness on his feet at times. Instructed patient and family to continue with current HEP which they verbalized understanding of.    Agustin Is progressing well towards his goals.    Pt prognosis is Good.     Pt will continue to benefit from skilled  outpatient physical therapy to address the deficits listed in the problem list box on initial evaluation, provide pt/family education and to maximize pt's level of independence in the home and community environment.     Pt's spiritual, cultural and educational needs considered and pt agreeable to plan of care and goals.     Anticipated barriers to physical therapy: language barrier, compliance, scheduling, transportation dependence    Goals:  Short Term Goals: 4 weeks   Patient will be independent with initial HEP for right lower extremity flexibility and functional strength to improve therapy outcomes. (Met)  Patient will improve right hip strength to >/= 3/5 to improve standing tolerance. (Met)  Patient will improve TUG test time from 52.30 seconds with 2WW to </= 35 seconds with 2WW to demonstrate decreased fall risk with ambulation. (Met)  Patient will improve modified 30 second sit to stand score from 4 repetitions to >/= 8 repetitions to demonstrate improved functional lower extremity strength. (Met)     Long Term Goals: 8 weeks   Patient will be independent with final HEP for right lower extremity strength, flexibility, and functional mobility to encourage long term functional independence. (Progressing)  Patient will improve right hip strength to >/= 4-/5 in all tested planes to improve his ability to perform household ADLs and navigate stairs. (Progressing)  Patient will demonstrate independence with sit<>stands to improve his ability to navigate his home. (Progressing)  Patient will improve TUG test time from 52.30 seconds to </= 20 seconds to demonstrate decreased fall risk with functional mobility. (Progressing)    PLAN     Continue progressing per patient tolerance and POC. Add single leg stance.    Colton Lewis, PT, DPT

## 2023-03-06 ENCOUNTER — DOCUMENTATION ONLY (OUTPATIENT)
Dept: REHABILITATION | Facility: HOSPITAL | Age: 83
End: 2023-03-06

## 2023-03-07 NOTE — PROGRESS NOTES
Outpatient Therapy Discharge Summary     Name: Agustin Hernandes  Clinic Number: [id number]    Therapy Diagnosis: No diagnosis found.  Physician: No ref. provider found    Physician Orders: PT Eval and Treat  Medical Diagnosis: S72.001A (ICD-10-CM) - Closed fracture of neck of right femur, initial encounter  Evaluation Date: 02/08/2023    Date of Last visit: 02/27/2023  Total Visits Received: 7  Cancelled Visits: 0  No Show Visits: 0    Assessment      Goals:  Short Term Goals: 4 weeks   Patient will be independent with initial HEP for right lower extremity flexibility and functional strength to improve therapy outcomes. (Met)  Patient will improve right hip strength to >/= 3/5 to improve standing tolerance. (Met)  Patient will improve TUG test time from 52.30 seconds with 2WW to </= 35 seconds with 2WW to demonstrate decreased fall risk with ambulation. (Met)  Patient will improve modified 30 second sit to stand score from 4 repetitions to >/= 8 repetitions to demonstrate improved functional lower extremity strength. (Met)     Long Term Goals: 8 weeks   Patient will be independent with final HEP for right lower extremity strength, flexibility, and functional mobility to encourage long term functional independence. (Progressing)  Patient will improve right hip strength to >/= 4-/5 in all tested planes to improve his ability to perform household ADLs and navigate stairs. (Progressing)  Patient will demonstrate independence with sit<>stands to improve his ability to navigate his home. (Progressing)  Patient will improve TUG test time from 52.30 seconds to </= 20 seconds to demonstrate decreased fall risk with functional mobility. (Progressing)    Discharge reason: Patient has completed allowable visits authorized by insurance. At time of last visit patient was progressing well towards his goals, and had developed a home exercise program which he and his family were compliant with performing.    Plan     This patient  is discharged from Physical Therapy due to reaching max allowed visits by his insurance company.    Colton Lewis, PT, DPT

## 2023-03-10 ENCOUNTER — OFFICE VISIT (OUTPATIENT)
Dept: ORTHOPEDICS | Facility: CLINIC | Age: 83
End: 2023-03-10
Payer: MEDICAID

## 2023-03-10 ENCOUNTER — HOSPITAL ENCOUNTER (OUTPATIENT)
Dept: RADIOLOGY | Facility: HOSPITAL | Age: 83
Discharge: HOME | End: 2023-03-10
Attending: NURSE PRACTITIONER
Payer: MEDICAID

## 2023-03-10 VITALS
BODY MASS INDEX: 19.14 KG/M2 | HEIGHT: 71 IN | TEMPERATURE: 98 F | DIASTOLIC BLOOD PRESSURE: 70 MMHG | WEIGHT: 136.69 LBS | SYSTOLIC BLOOD PRESSURE: 131 MMHG

## 2023-03-10 DIAGNOSIS — Z98.890 POST-OPERATIVE STATE: ICD-10-CM

## 2023-03-10 DIAGNOSIS — M25.551 RIGHT HIP PAIN: Primary | ICD-10-CM

## 2023-03-10 DIAGNOSIS — S72.001D CLOSED FRACTURE OF NECK OF RIGHT FEMUR WITH ROUTINE HEALING, SUBSEQUENT ENCOUNTER: Primary | ICD-10-CM

## 2023-03-10 DIAGNOSIS — M25.551 RIGHT HIP PAIN: ICD-10-CM

## 2023-03-10 PROCEDURE — 3075F SYST BP GE 130 - 139MM HG: CPT | Mod: CPTII,,, | Performed by: NURSE PRACTITIONER

## 2023-03-10 PROCEDURE — 1125F PR PAIN SEVERITY QUANTIFIED, PAIN PRESENT: ICD-10-PCS | Mod: CPTII,,, | Performed by: NURSE PRACTITIONER

## 2023-03-10 PROCEDURE — 1159F PR MEDICATION LIST DOCUMENTED IN MEDICAL RECORD: ICD-10-PCS | Mod: CPTII,,, | Performed by: NURSE PRACTITIONER

## 2023-03-10 PROCEDURE — 3078F DIAST BP <80 MM HG: CPT | Mod: CPTII,,, | Performed by: NURSE PRACTITIONER

## 2023-03-10 PROCEDURE — 3078F PR MOST RECENT DIASTOLIC BLOOD PRESSURE < 80 MM HG: ICD-10-PCS | Mod: CPTII,,, | Performed by: NURSE PRACTITIONER

## 2023-03-10 PROCEDURE — 99213 OFFICE O/P EST LOW 20 MIN: CPT | Mod: PBBFAC | Performed by: NURSE PRACTITIONER

## 2023-03-10 PROCEDURE — 99999 PR PBB SHADOW E&M-EST. PATIENT-LVL III: ICD-10-PCS | Mod: PBBFAC,,, | Performed by: NURSE PRACTITIONER

## 2023-03-10 PROCEDURE — 73502 XR HIP WITH PELVIS WHEN PERFORMED, 2 OR 3  VIEWS RIGHT: ICD-10-PCS | Mod: 26,RT,, | Performed by: RADIOLOGY

## 2023-03-10 PROCEDURE — 1160F PR REVIEW ALL MEDS BY PRESCRIBER/CLIN PHARMACIST DOCUMENTED: ICD-10-PCS | Mod: CPTII,,, | Performed by: NURSE PRACTITIONER

## 2023-03-10 PROCEDURE — 1159F MED LIST DOCD IN RCRD: CPT | Mod: CPTII,,, | Performed by: NURSE PRACTITIONER

## 2023-03-10 PROCEDURE — 99024 PR POST-OP FOLLOW-UP VISIT: ICD-10-PCS | Mod: ,,, | Performed by: NURSE PRACTITIONER

## 2023-03-10 PROCEDURE — 3075F PR MOST RECENT SYSTOLIC BLOOD PRESS GE 130-139MM HG: ICD-10-PCS | Mod: CPTII,,, | Performed by: NURSE PRACTITIONER

## 2023-03-10 PROCEDURE — 73502 X-RAY EXAM HIP UNI 2-3 VIEWS: CPT | Mod: 26,RT,, | Performed by: RADIOLOGY

## 2023-03-10 PROCEDURE — 1125F AMNT PAIN NOTED PAIN PRSNT: CPT | Mod: CPTII,,, | Performed by: NURSE PRACTITIONER

## 2023-03-10 PROCEDURE — 73502 X-RAY EXAM HIP UNI 2-3 VIEWS: CPT | Mod: TC,RT

## 2023-03-10 PROCEDURE — 1160F RVW MEDS BY RX/DR IN RCRD: CPT | Mod: CPTII,,, | Performed by: NURSE PRACTITIONER

## 2023-03-10 PROCEDURE — 99024 POSTOP FOLLOW-UP VISIT: CPT | Mod: ,,, | Performed by: NURSE PRACTITIONER

## 2023-03-10 PROCEDURE — 99999 PR PBB SHADOW E&M-EST. PATIENT-LVL III: CPT | Mod: PBBFAC,,, | Performed by: NURSE PRACTITIONER

## 2023-03-10 NOTE — PROGRESS NOTES
Mr. Leroy Hernandes is here today for a post-operative visit after undergoing a right DHEERAJ, anterior approach by Dr. Nunez on 2/3/2023.    Interval History:  He reports that he is doing well.  He reports no pain.  He is not taking pain medication.  He is going to outpatient physical therapy with [facility].  He is currently walking with a straight cane.  Denies any falls or injuries since his surgery.  Denies groin pain.  He will be returning home to [place] on Tuesday next week.  He has obtain a copy of all of his medical records including images to take back with him.  He does have an orthopedist back at home.  He denies fever, chills, and sweats since the time of the surgery.     Physical exam:  Incision is open air and healed.  No signs of infection seen.  Patient is able to stand independently.  He is walking with a straight cane with an antalgic gait.  He has tactile stimulation to their lower leg, he denies calf pain, there is no leg edema and their pedal pulse is palpable x 2.     RADS: Right hip xray was obtained and personally reviewed by me, findings show right total hip arthroplasty appears to be in good position and alignment.  No evidence of hardware failure seen.  No new fracture seen.    Assessment:  Post-op visit (6 weeks)    Plan:    ICD-10-CM ICD-9-CM   1. Closed fracture of neck of right femur with routine healing, subsequent encounter  S72.001D V54.13   2. Post-operative state  Z98.890 V45.89       Current care, treatment plan, precautions, activity level/ modifications, limitations, rehabilitation exercises and proposed future treatment were discussed with the patient. We discussed the need to monitor for changes in symptoms and condition and report them to the physician.  Discussed importance of compliance with all appointments and follow up examinations.       PHYSICAL THERAPY:   - He has been discharged from physical therapy and will be returning home next week.  - Weight bearing as  tolerated   - Range of motion as tolerated. Needs to avoid extremes of motion but no specific hip precautions     PAIN MEDICATION:   - Pain medication: refill was not needed  - Pain medication refill policy provided to patient for review, yes.    - Patient was informed a multi-modal approach is used to treat their pain.     DVT PROPHYLAXIS:   - Eliquis 2.5 mg bid x 4 weeks.  Completed therapy.  Is advised he should take an aspirin 81 mg twice a day 2 days before day of and day after his trip to return home.  This is to avoid any DVTs.     FOLLOW UP:   - Patient will follow up his orthopedist back in his home town.  It is recommended that he follow-up in 6 weeks.  - X-ray of his right hip is recommended.      If there are any questions prior to scheduled follow up, the patient was instructed to contact the office

## 2023-05-03 ENCOUNTER — PATIENT MESSAGE (OUTPATIENT)
Dept: ADMINISTRATIVE | Facility: OTHER | Age: 83
End: 2023-05-03
Payer: MEDICAID

## 2023-05-08 PROBLEM — J96.11 CHRONIC HYPOXEMIC RESPIRATORY FAILURE: Status: RESOLVED | Noted: 2023-02-02 | Resolved: 2023-05-08

## 2024-01-28 ENCOUNTER — PATIENT MESSAGE (OUTPATIENT)
Dept: ADMINISTRATIVE | Facility: OTHER | Age: 84
End: 2024-01-28
Payer: MEDICAID

## 2024-02-02 ENCOUNTER — PATIENT MESSAGE (OUTPATIENT)
Dept: ADMINISTRATIVE | Facility: OTHER | Age: 84
End: 2024-02-02
Payer: MEDICAID

## (undated) DEVICE — NDL 18GA X1 1/2 REG BEVEL

## (undated) DEVICE — DRESSING AQUACEL RIBBON 2X45CM

## (undated) DEVICE — GUIDEWIRE BALL TIP 2.5X800MM
Type: IMPLANTABLE DEVICE | Site: HIP | Status: NON-FUNCTIONAL
Removed: 2023-02-03

## (undated) DEVICE — TIP IRR FEM CANAL CO-AXIAL

## (undated) DEVICE — GAUZE SPONGE 4X4 12PLY

## (undated) DEVICE — SUT VICRYL+ 1 CT1 18IN

## (undated) DEVICE — PACK DRAPE UNIVERSAL CONVERTOR

## (undated) DEVICE — SUT 1 36IN COATED VICRYL UN

## (undated) DEVICE — SYS CLSR DERMABOND PRINEO 22CM

## (undated) DEVICE — BLADE SAGITTAL 18 X 1.27 X 90M

## (undated) DEVICE — ELECTRODE REM PLYHSV RETURN 9

## (undated) DEVICE — SUT VICRYL PLUS 0 CT1 18IN

## (undated) DEVICE — KIT IRR SUCTION HND PIECE

## (undated) DEVICE — PRESSURIZER HI VAC HIP KIT

## (undated) DEVICE — DRAPE IOBAN 2 STERI

## (undated) DEVICE — CONTAINER SPECIMEN STRL 4OZ

## (undated) DEVICE — DRAPE C-ARM ELAS CLIP 42X120IN

## (undated) DEVICE — KIT BONE CEMENT PREPARATION

## (undated) DEVICE — DRAPE C-ARMOR EQUIPMENT COVER

## (undated) DEVICE — SUT MONOCRYL 3-0 PS-2 UND

## (undated) DEVICE — DRAPE THREE-QTR REINF 53X77IN

## (undated) DEVICE — KIT TOTAL HIP HPOFH OMC

## (undated) DEVICE — TAPE SURG DURAPORE 2 X10YD

## (undated) DEVICE — SUT VICRYL PLUS 2-0 CT1 18

## (undated) DEVICE — KIT PT CARE HANA PROFX SSXT